# Patient Record
Sex: MALE | Race: WHITE | Employment: FULL TIME | URBAN - METROPOLITAN AREA
[De-identification: names, ages, dates, MRNs, and addresses within clinical notes are randomized per-mention and may not be internally consistent; named-entity substitution may affect disease eponyms.]

---

## 2018-10-31 ENCOUNTER — HOSPITAL ENCOUNTER (EMERGENCY)
Age: 23
Discharge: HOME | End: 2018-10-31
Payer: COMMERCIAL

## 2018-10-31 VITALS
TEMPERATURE: 98.4 F | DIASTOLIC BLOOD PRESSURE: 98 MMHG | HEART RATE: 100 BPM | WEIGHT: 272 LBS | RESPIRATION RATE: 18 BRPM | SYSTOLIC BLOOD PRESSURE: 121 MMHG | OXYGEN SATURATION: 99 % | BODY MASS INDEX: 37.94 KG/M2

## 2018-10-31 DIAGNOSIS — M25.512 ACUTE PAIN OF LEFT SHOULDER: Primary | ICD-10-CM

## 2018-10-31 DIAGNOSIS — M54.50 ACUTE BILATERAL LOW BACK PAIN WITHOUT SCIATICA: ICD-10-CM

## 2018-10-31 PROCEDURE — 96372 THER/PROPH/DIAG INJ SC/IM: CPT

## 2018-10-31 PROCEDURE — 6370000000 HC RX 637 (ALT 250 FOR IP): Performed by: PHYSICIAN ASSISTANT

## 2018-10-31 PROCEDURE — 6360000002 HC RX W HCPCS: Performed by: PHYSICIAN ASSISTANT

## 2018-10-31 PROCEDURE — 99282 EMERGENCY DEPT VISIT SF MDM: CPT

## 2018-10-31 RX ORDER — DIAZEPAM 5 MG/1
5 TABLET ORAL ONCE
Status: COMPLETED | OUTPATIENT
Start: 2018-10-31 | End: 2018-10-31

## 2018-10-31 RX ORDER — LIDOCAINE 4 G/G
1 PATCH TOPICAL ONCE
Status: DISCONTINUED | OUTPATIENT
Start: 2018-10-31 | End: 2018-11-01 | Stop reason: HOSPADM

## 2018-10-31 RX ORDER — DIAZEPAM 5 MG/1
5 TABLET ORAL EVERY 6 HOURS PRN
Qty: 10 TABLET | Refills: 0 | Status: SHIPPED | OUTPATIENT
Start: 2018-10-31 | End: 2018-11-07

## 2018-10-31 RX ORDER — KETOROLAC TROMETHAMINE 30 MG/ML
30 INJECTION, SOLUTION INTRAMUSCULAR; INTRAVENOUS ONCE
Status: COMPLETED | OUTPATIENT
Start: 2018-10-31 | End: 2018-10-31

## 2018-10-31 RX ORDER — NAPROXEN 500 MG/1
500 TABLET ORAL 2 TIMES DAILY PRN
Qty: 30 TABLET | Refills: 0 | Status: SHIPPED | OUTPATIENT
Start: 2018-10-31 | End: 2021-11-11

## 2018-10-31 RX ADMIN — DIAZEPAM 5 MG: 5 TABLET ORAL at 22:46

## 2018-10-31 RX ADMIN — KETOROLAC TROMETHAMINE 30 MG: 30 INJECTION, SOLUTION INTRAMUSCULAR; INTRAVENOUS at 22:46

## 2018-10-31 ASSESSMENT — PAIN SCALES - GENERAL
PAINLEVEL_OUTOF10: 8
PAINLEVEL_OUTOF10: 7

## 2018-10-31 ASSESSMENT — PAIN DESCRIPTION - LOCATION: LOCATION: BACK

## 2018-10-31 ASSESSMENT — PAIN DESCRIPTION - PAIN TYPE: TYPE: ACUTE PAIN

## 2018-11-01 NOTE — ED PROVIDER NOTES
MEDICAL DECISION MAKING                     Based on Pt's history (including stratification of the above red flags) & physical exam findings today, I do not see evidence of spinal cord compression/focal neuro deficits, cauda equina syndrome, epidural abscess, or osteomyelitis on exam today. I suspect patient may have started pain prior to work but work has likely exacerbated his symptoms causing worsening pain. Patient has not had any injury or trauma. No suspicion for acute causes patient's pain. Patient likely has muscular skeletal strain. Recommend rice therapy, anti-inflammatory, stretching. Patient also complained of some left shoulder pain although when I enter the room patient had his hand propped up behind his arm without any difficulty but demented exam acted as if this was painful to range of motion the shoulder. When not examining the shoulder he had full range of motion of the shoulder. Patient is neurologically intact, nontoxic appearing, tolerating by mouth intake. No numbness or tingling no difficulty ambulating. Suspect strain. Recommend rice therapy, and temperature is, stretching, back strengthening exercises for injury at his new job in the future. Patient comfortable with this workup and plan as his spouse at bedside. History and exam is consistent with musculoskeletal back pain - Symptomatic treatment provided today. I discussed stretching exercises and avoid vigorous activity today at bedside. I recommend supportive OTC back brace for the next several days. I recommend followup with primary care provider over the next several days for recheck. Patient agrees to return emergency department if symptoms worsen or any new symptoms develop - this was discussed in detail at beside with Pt. Vital signs and nursing notes reviewed during ED course. I have independently evaluated this patient .   Supervising MD present in the Emergency Department, available for consultation, throughout entirety of  patient care. Clinical  IMPRESSION    1. Acute pain of left shoulder    2. Acute bilateral low back pain without sciatica                Comment: Please note this report has been produced using speech recognition software and may contain errors related to that system including errors in grammar, punctuation, and spelling, as well as words and phrases that may be inappropriate. If there are any questions or concerns please feel free to contact the dictating provider for clarification.         Sadia Epperson PA-C  10/31/18 7125

## 2019-02-09 ENCOUNTER — HOSPITAL ENCOUNTER (EMERGENCY)
Age: 24
Discharge: HOME | End: 2019-02-09
Payer: COMMERCIAL

## 2019-02-09 VITALS
SYSTOLIC BLOOD PRESSURE: 120 MMHG | BODY MASS INDEX: 38.19 KG/M2 | RESPIRATION RATE: 16 BRPM | HEART RATE: 92 BPM | DIASTOLIC BLOOD PRESSURE: 76 MMHG | HEIGHT: 72 IN | WEIGHT: 282 LBS | TEMPERATURE: 98 F | OXYGEN SATURATION: 95 %

## 2019-02-09 DIAGNOSIS — R11.2 NON-INTRACTABLE VOMITING WITH NAUSEA, UNSPECIFIED VOMITING TYPE: Primary | ICD-10-CM

## 2019-02-09 LAB
ALBUMIN SERPL-MCNC: 4.2 GM/DL (ref 3.4–5)
ALP BLD-CCNC: 73 IU/L (ref 40–129)
ALT SERPL-CCNC: 50 U/L (ref 10–40)
ANION GAP SERPL CALCULATED.3IONS-SCNC: 16 MMOL/L (ref 4–16)
AST SERPL-CCNC: 33 IU/L (ref 15–37)
BASOPHILS ABSOLUTE: 0 K/CU MM
BASOPHILS RELATIVE PERCENT: 0.4 % (ref 0–1)
BILIRUB SERPL-MCNC: 0.4 MG/DL (ref 0–1)
BUN BLDV-MCNC: 13 MG/DL (ref 6–23)
CALCIUM SERPL-MCNC: 9.1 MG/DL (ref 8.3–10.6)
CHLORIDE BLD-SCNC: 103 MMOL/L (ref 99–110)
CO2: 23 MMOL/L (ref 21–32)
CREAT SERPL-MCNC: 0.9 MG/DL (ref 0.9–1.3)
DIFFERENTIAL TYPE: ABNORMAL
EOSINOPHILS ABSOLUTE: 0 K/CU MM
EOSINOPHILS RELATIVE PERCENT: 0.4 % (ref 0–3)
GFR AFRICAN AMERICAN: >60 ML/MIN/1.73M2
GFR NON-AFRICAN AMERICAN: >60 ML/MIN/1.73M2
GLUCOSE BLD-MCNC: 117 MG/DL (ref 70–99)
HCT VFR BLD CALC: 49 % (ref 42–52)
HEMOGLOBIN: 16.3 GM/DL (ref 13.5–18)
IMMATURE NEUTROPHIL %: 0.3 % (ref 0–0.43)
LIPASE: 20 IU/L (ref 13–60)
LYMPHOCYTES ABSOLUTE: 1.4 K/CU MM
LYMPHOCYTES RELATIVE PERCENT: 20.1 % (ref 24–44)
MCH RBC QN AUTO: 29.6 PG (ref 27–31)
MCHC RBC AUTO-ENTMCNC: 33.3 % (ref 32–36)
MCV RBC AUTO: 89.1 FL (ref 78–100)
MONOCYTES ABSOLUTE: 0.7 K/CU MM
MONOCYTES RELATIVE PERCENT: 9.4 % (ref 0–4)
NUCLEATED RBC %: 0 %
PDW BLD-RTO: 12.2 % (ref 11.7–14.9)
PLATELET # BLD: 304 K/CU MM (ref 140–440)
PMV BLD AUTO: 10.4 FL (ref 7.5–11.1)
POTASSIUM SERPL-SCNC: 3.6 MMOL/L (ref 3.5–5.1)
RBC # BLD: 5.5 M/CU MM (ref 4.6–6.2)
SEGMENTED NEUTROPHILS ABSOLUTE COUNT: 5 K/CU MM
SEGMENTED NEUTROPHILS RELATIVE PERCENT: 69.4 % (ref 36–66)
SODIUM BLD-SCNC: 142 MMOL/L (ref 135–145)
TOTAL IMMATURE NEUTOROPHIL: 0.02 K/CU MM
TOTAL NUCLEATED RBC: 0 K/CU MM
TOTAL PROTEIN: 7.5 GM/DL (ref 6.4–8.2)
WBC # BLD: 7.1 K/CU MM (ref 4–10.5)

## 2019-02-09 PROCEDURE — 93010 ELECTROCARDIOGRAM REPORT: CPT | Performed by: INTERNAL MEDICINE

## 2019-02-09 PROCEDURE — 85025 COMPLETE CBC W/AUTO DIFF WBC: CPT

## 2019-02-09 PROCEDURE — 80053 COMPREHEN METABOLIC PANEL: CPT

## 2019-02-09 PROCEDURE — 93005 ELECTROCARDIOGRAM TRACING: CPT | Performed by: PHYSICIAN ASSISTANT

## 2019-02-09 PROCEDURE — 83690 ASSAY OF LIPASE: CPT

## 2019-02-09 PROCEDURE — 6370000000 HC RX 637 (ALT 250 FOR IP): Performed by: EMERGENCY MEDICINE

## 2019-02-09 PROCEDURE — 99285 EMERGENCY DEPT VISIT HI MDM: CPT

## 2019-02-09 RX ORDER — ONDANSETRON 4 MG/1
4 TABLET, ORALLY DISINTEGRATING ORAL ONCE
Status: COMPLETED | OUTPATIENT
Start: 2019-02-09 | End: 2019-02-09

## 2019-02-09 RX ORDER — ONDANSETRON 4 MG/1
4 TABLET, FILM COATED ORAL EVERY 8 HOURS PRN
Qty: 10 TABLET | Refills: 0 | Status: SHIPPED | OUTPATIENT
Start: 2019-02-09 | End: 2021-11-11

## 2019-02-09 RX ADMIN — ONDANSETRON 4 MG: 4 TABLET, ORALLY DISINTEGRATING ORAL at 23:28

## 2019-02-12 LAB
EKG ATRIAL RATE: 87 BPM
EKG DIAGNOSIS: NORMAL
EKG P AXIS: 45 DEGREES
EKG P-R INTERVAL: 150 MS
EKG Q-T INTERVAL: 356 MS
EKG QRS DURATION: 90 MS
EKG QTC CALCULATION (BAZETT): 428 MS
EKG R AXIS: 18 DEGREES
EKG T AXIS: 17 DEGREES
EKG VENTRICULAR RATE: 87 BPM

## 2021-11-11 ENCOUNTER — HOSPITAL ENCOUNTER (INPATIENT)
Age: 26
LOS: 5 days | Discharge: HOME | DRG: 137 | End: 2021-11-16
Attending: EMERGENCY MEDICINE | Admitting: FAMILY MEDICINE
Payer: COMMERCIAL

## 2021-11-11 ENCOUNTER — APPOINTMENT (OUTPATIENT)
Dept: GENERAL RADIOLOGY | Age: 26
DRG: 137 | End: 2021-11-11
Payer: COMMERCIAL

## 2021-11-11 DIAGNOSIS — U07.1 COVID-19: Primary | ICD-10-CM

## 2021-11-11 DIAGNOSIS — J96.01 ACUTE RESPIRATORY FAILURE WITH HYPOXIA (HCC): ICD-10-CM

## 2021-11-11 PROBLEM — J12.82 PNEUMONIA DUE TO COVID-19 VIRUS: Status: ACTIVE | Noted: 2021-11-11

## 2021-11-11 LAB
ALBUMIN SERPL-MCNC: 3.7 GM/DL (ref 3.4–5)
ALP BLD-CCNC: 46 IU/L (ref 40–129)
ALT SERPL-CCNC: 69 U/L (ref 10–40)
ANION GAP SERPL CALCULATED.3IONS-SCNC: 14 MMOL/L (ref 4–16)
AST SERPL-CCNC: 87 IU/L (ref 15–37)
BASOPHILS ABSOLUTE: 0 K/CU MM
BASOPHILS RELATIVE PERCENT: 0.1 % (ref 0–1)
BILIRUB SERPL-MCNC: 0.6 MG/DL (ref 0–1)
BUN BLDV-MCNC: 12 MG/DL (ref 6–23)
CALCIUM SERPL-MCNC: 7.9 MG/DL (ref 8.3–10.6)
CHLORIDE BLD-SCNC: 96 MMOL/L (ref 99–110)
CO2: 24 MMOL/L (ref 21–32)
CREAT SERPL-MCNC: 0.9 MG/DL (ref 0.9–1.3)
D DIMER: 330 NG/ML(DDU)
DIFFERENTIAL TYPE: ABNORMAL
EOSINOPHILS ABSOLUTE: 0 K/CU MM
EOSINOPHILS RELATIVE PERCENT: 0 % (ref 0–3)
FERRITIN: 850 NG/ML (ref 30–400)
GFR AFRICAN AMERICAN: >60 ML/MIN/1.73M2
GFR NON-AFRICAN AMERICAN: >60 ML/MIN/1.73M2
GLUCOSE BLD-MCNC: 130 MG/DL (ref 70–99)
HCT VFR BLD CALC: 48.1 % (ref 42–52)
HEMOGLOBIN: 16.4 GM/DL (ref 13.5–18)
HIGH SENSITIVE C-REACTIVE PROTEIN: 154.6 MG/L
IMMATURE NEUTROPHIL %: 0.4 % (ref 0–0.43)
LACTATE DEHYDROGENASE: 595 IU/L (ref 120–246)
LYMPHOCYTES ABSOLUTE: 0.4 K/CU MM
LYMPHOCYTES RELATIVE PERCENT: 5.1 % (ref 24–44)
MCH RBC QN AUTO: 29.2 PG (ref 27–31)
MCHC RBC AUTO-ENTMCNC: 34.1 % (ref 32–36)
MCV RBC AUTO: 85.7 FL (ref 78–100)
MONOCYTES ABSOLUTE: 0.2 K/CU MM
MONOCYTES RELATIVE PERCENT: 2.3 % (ref 0–4)
NUCLEATED RBC %: 0 %
PDW BLD-RTO: 12.2 % (ref 11.7–14.9)
PLATELET # BLD: 222 K/CU MM (ref 140–440)
PMV BLD AUTO: 9.7 FL (ref 7.5–11.1)
POTASSIUM SERPL-SCNC: 3.6 MMOL/L (ref 3.5–5.1)
PROCALCITONIN: 0.15
RAPID INFLUENZA  B AGN: NEGATIVE
RAPID INFLUENZA A AGN: NEGATIVE
RBC # BLD: 5.61 M/CU MM (ref 4.6–6.2)
SARS-COV-2, NAAT: DETECTED
SEGMENTED NEUTROPHILS ABSOLUTE COUNT: 6.5 K/CU MM
SEGMENTED NEUTROPHILS RELATIVE PERCENT: 92.1 % (ref 36–66)
SODIUM BLD-SCNC: 134 MMOL/L (ref 135–145)
SOURCE: ABNORMAL
TOTAL IMMATURE NEUTOROPHIL: 0.03 K/CU MM
TOTAL NUCLEATED RBC: 0 K/CU MM
TOTAL PROTEIN: 6.8 GM/DL (ref 6.4–8.2)
WBC # BLD: 7.1 K/CU MM (ref 4–10.5)

## 2021-11-11 PROCEDURE — 85379 FIBRIN DEGRADATION QUANT: CPT

## 2021-11-11 PROCEDURE — 87635 SARS-COV-2 COVID-19 AMP PRB: CPT

## 2021-11-11 PROCEDURE — 96374 THER/PROPH/DIAG INJ IV PUSH: CPT

## 2021-11-11 PROCEDURE — 86141 C-REACTIVE PROTEIN HS: CPT

## 2021-11-11 PROCEDURE — 2580000003 HC RX 258: Performed by: NURSE PRACTITIONER

## 2021-11-11 PROCEDURE — 82728 ASSAY OF FERRITIN: CPT

## 2021-11-11 PROCEDURE — 6370000000 HC RX 637 (ALT 250 FOR IP): Performed by: NURSE PRACTITIONER

## 2021-11-11 PROCEDURE — 85025 COMPLETE CBC W/AUTO DIFF WBC: CPT

## 2021-11-11 PROCEDURE — 2060000000 HC ICU INTERMEDIATE R&B

## 2021-11-11 PROCEDURE — 2500000003 HC RX 250 WO HCPCS: Performed by: NURSE PRACTITIONER

## 2021-11-11 PROCEDURE — 6360000002 HC RX W HCPCS: Performed by: NURSE PRACTITIONER

## 2021-11-11 PROCEDURE — 84145 PROCALCITONIN (PCT): CPT

## 2021-11-11 PROCEDURE — XW0DXM6 INTRODUCTION OF BARICITINIB INTO MOUTH AND PHARYNX, EXTERNAL APPROACH, NEW TECHNOLOGY GROUP 6: ICD-10-PCS | Performed by: INTERNAL MEDICINE

## 2021-11-11 PROCEDURE — 94640 AIRWAY INHALATION TREATMENT: CPT

## 2021-11-11 PROCEDURE — 94761 N-INVAS EAR/PLS OXIMETRY MLT: CPT

## 2021-11-11 PROCEDURE — 2700000000 HC OXYGEN THERAPY PER DAY

## 2021-11-11 PROCEDURE — 6360000002 HC RX W HCPCS: Performed by: EMERGENCY MEDICINE

## 2021-11-11 PROCEDURE — 80053 COMPREHEN METABOLIC PANEL: CPT

## 2021-11-11 PROCEDURE — 99284 EMERGENCY DEPT VISIT MOD MDM: CPT

## 2021-11-11 PROCEDURE — 87324 CLOSTRIDIUM AG IA: CPT

## 2021-11-11 PROCEDURE — XW033E5 INTRODUCTION OF REMDESIVIR ANTI-INFECTIVE INTO PERIPHERAL VEIN, PERCUTANEOUS APPROACH, NEW TECHNOLOGY GROUP 5: ICD-10-PCS | Performed by: INTERNAL MEDICINE

## 2021-11-11 PROCEDURE — 87449 NOS EACH ORGANISM AG IA: CPT

## 2021-11-11 PROCEDURE — 71045 X-RAY EXAM CHEST 1 VIEW: CPT

## 2021-11-11 PROCEDURE — 83615 LACTATE (LD) (LDH) ENZYME: CPT

## 2021-11-11 PROCEDURE — 87804 INFLUENZA ASSAY W/OPTIC: CPT

## 2021-11-11 PROCEDURE — 6370000000 HC RX 637 (ALT 250 FOR IP): Performed by: FAMILY MEDICINE

## 2021-11-11 RX ORDER — DEXAMETHASONE SODIUM PHOSPHATE 10 MG/ML
10 INJECTION, SOLUTION INTRAMUSCULAR; INTRAVENOUS ONCE
Status: COMPLETED | OUTPATIENT
Start: 2021-11-11 | End: 2021-11-11

## 2021-11-11 RX ORDER — SODIUM CHLORIDE 0.9 % (FLUSH) 0.9 %
5-40 SYRINGE (ML) INJECTION PRN
Status: DISCONTINUED | OUTPATIENT
Start: 2021-11-11 | End: 2021-11-16 | Stop reason: HOSPADM

## 2021-11-11 RX ORDER — ASCORBIC ACID 500 MG
500 TABLET ORAL DAILY
Status: DISCONTINUED | OUTPATIENT
Start: 2021-11-11 | End: 2021-11-16 | Stop reason: HOSPADM

## 2021-11-11 RX ORDER — SODIUM CHLORIDE 0.9 % (FLUSH) 0.9 %
5-40 SYRINGE (ML) INJECTION EVERY 12 HOURS SCHEDULED
Status: DISCONTINUED | OUTPATIENT
Start: 2021-11-11 | End: 2021-11-16 | Stop reason: HOSPADM

## 2021-11-11 RX ORDER — DEXAMETHASONE SODIUM PHOSPHATE 4 MG/ML
6 INJECTION, SOLUTION INTRA-ARTICULAR; INTRALESIONAL; INTRAMUSCULAR; INTRAVENOUS; SOFT TISSUE EVERY 24 HOURS
Status: DISCONTINUED | OUTPATIENT
Start: 2021-11-12 | End: 2021-11-16 | Stop reason: HOSPADM

## 2021-11-11 RX ORDER — ALBUTEROL SULFATE 90 UG/1
2 AEROSOL, METERED RESPIRATORY (INHALATION) EVERY 4 HOURS
Status: DISCONTINUED | OUTPATIENT
Start: 2021-11-11 | End: 2021-11-15

## 2021-11-11 RX ORDER — FAMOTIDINE 20 MG/1
20 TABLET, FILM COATED ORAL 2 TIMES DAILY
Status: DISCONTINUED | OUTPATIENT
Start: 2021-11-11 | End: 2021-11-16 | Stop reason: HOSPADM

## 2021-11-11 RX ORDER — ZINC SULFATE 50(220)MG
50 CAPSULE ORAL DAILY
Status: DISCONTINUED | OUTPATIENT
Start: 2021-11-11 | End: 2021-11-16 | Stop reason: HOSPADM

## 2021-11-11 RX ORDER — 0.9 % SODIUM CHLORIDE 0.9 %
30 INTRAVENOUS SOLUTION INTRAVENOUS PRN
Status: DISCONTINUED | OUTPATIENT
Start: 2021-11-11 | End: 2021-11-16 | Stop reason: HOSPADM

## 2021-11-11 RX ORDER — ALBUTEROL SULFATE 90 UG/1
2 AEROSOL, METERED RESPIRATORY (INHALATION)
Status: DISCONTINUED | OUTPATIENT
Start: 2021-11-11 | End: 2021-11-16 | Stop reason: HOSPADM

## 2021-11-11 RX ORDER — BENZONATATE 100 MG/1
100 CAPSULE ORAL 3 TIMES DAILY PRN
Status: DISCONTINUED | OUTPATIENT
Start: 2021-11-11 | End: 2021-11-16 | Stop reason: HOSPADM

## 2021-11-11 RX ORDER — POLYETHYLENE GLYCOL 3350 17 G/17G
17 POWDER, FOR SOLUTION ORAL DAILY PRN
Status: DISCONTINUED | OUTPATIENT
Start: 2021-11-11 | End: 2021-11-16 | Stop reason: HOSPADM

## 2021-11-11 RX ORDER — ONDANSETRON 2 MG/ML
4 INJECTION INTRAMUSCULAR; INTRAVENOUS EVERY 6 HOURS PRN
Status: DISCONTINUED | OUTPATIENT
Start: 2021-11-11 | End: 2021-11-16 | Stop reason: HOSPADM

## 2021-11-11 RX ORDER — ACETAMINOPHEN 325 MG/1
650 TABLET ORAL EVERY 6 HOURS PRN
Status: DISCONTINUED | OUTPATIENT
Start: 2021-11-11 | End: 2021-11-16 | Stop reason: HOSPADM

## 2021-11-11 RX ORDER — ONDANSETRON 4 MG/1
4 TABLET, ORALLY DISINTEGRATING ORAL EVERY 8 HOURS PRN
Status: DISCONTINUED | OUTPATIENT
Start: 2021-11-11 | End: 2021-11-16 | Stop reason: HOSPADM

## 2021-11-11 RX ORDER — GUAIFENESIN/DEXTROMETHORPHAN 100-10MG/5
5 SYRUP ORAL EVERY 4 HOURS PRN
Status: DISCONTINUED | OUTPATIENT
Start: 2021-11-11 | End: 2021-11-16 | Stop reason: HOSPADM

## 2021-11-11 RX ORDER — ACETAMINOPHEN 650 MG/1
650 SUPPOSITORY RECTAL EVERY 6 HOURS PRN
Status: DISCONTINUED | OUTPATIENT
Start: 2021-11-11 | End: 2021-11-16 | Stop reason: HOSPADM

## 2021-11-11 RX ORDER — SODIUM CHLORIDE 9 MG/ML
25 INJECTION, SOLUTION INTRAVENOUS PRN
Status: DISCONTINUED | OUTPATIENT
Start: 2021-11-11 | End: 2021-11-16 | Stop reason: HOSPADM

## 2021-11-11 RX ADMIN — IPRATROPIUM BROMIDE 2 PUFF: 17 AEROSOL, METERED RESPIRATORY (INHALATION) at 13:35

## 2021-11-11 RX ADMIN — ENOXAPARIN SODIUM 30 MG: 100 INJECTION SUBCUTANEOUS at 20:19

## 2021-11-11 RX ADMIN — IPRATROPIUM BROMIDE 2 PUFF: 17 AEROSOL, METERED RESPIRATORY (INHALATION) at 07:45

## 2021-11-11 RX ADMIN — ENOXAPARIN SODIUM 30 MG: 100 INJECTION SUBCUTANEOUS at 09:48

## 2021-11-11 RX ADMIN — ALBUTEROL SULFATE 2 PUFF: 90 AEROSOL, METERED RESPIRATORY (INHALATION) at 07:45

## 2021-11-11 RX ADMIN — BARICITINIB 4 MG: 2 TABLET, FILM COATED ORAL at 13:34

## 2021-11-11 RX ADMIN — SODIUM CHLORIDE, PRESERVATIVE FREE 10 ML: 5 INJECTION INTRAVENOUS at 20:19

## 2021-11-11 RX ADMIN — Medication 50 MG: at 09:47

## 2021-11-11 RX ADMIN — SODIUM CHLORIDE, PRESERVATIVE FREE 10 ML: 5 INJECTION INTRAVENOUS at 13:35

## 2021-11-11 RX ADMIN — OXYCODONE HYDROCHLORIDE AND ACETAMINOPHEN 500 MG: 500 TABLET ORAL at 09:49

## 2021-11-11 RX ADMIN — FAMOTIDINE 20 MG: 20 TABLET, FILM COATED ORAL at 20:19

## 2021-11-11 RX ADMIN — Medication 2000 UNITS: at 09:47

## 2021-11-11 RX ADMIN — REMDESIVIR 200 MG: 100 INJECTION, POWDER, LYOPHILIZED, FOR SOLUTION INTRAVENOUS at 15:45

## 2021-11-11 RX ADMIN — DEXAMETHASONE SODIUM PHOSPHATE 10 MG: 10 INJECTION, SOLUTION INTRAMUSCULAR; INTRAVENOUS at 05:34

## 2021-11-11 RX ADMIN — FAMOTIDINE 20 MG: 20 TABLET, FILM COATED ORAL at 09:48

## 2021-11-11 RX ADMIN — ALBUTEROL SULFATE 2 PUFF: 90 AEROSOL, METERED RESPIRATORY (INHALATION) at 13:08

## 2021-11-11 NOTE — CONSULTS
Pharmacy Consult for Remdesivir Initiation per Josh Grissom, APRN-CNP    Criteria per [address] [employer]  **All criteria need to be met to receive   Remdesivir for COVID-19 patients**   Laboratory Results    Confirmed positive COVID-19   Yes   Clinical Status     Within 7 days of symptom onset (< 7 days)  Receiving corticosteroid therapy      Yes   Oxygen Status     Requiring supplemental oxygen (<10L)   Yes   Special Considerations    eGFR < 30mL/min: use with caution due to risk of cyclodextrin toxicity      None     Contraindications    1. Invasive or non-invasive mechanical ventilation (>10L NC, HFNC, heated vapotherm, biPAP, or mechanical ventilation)    2. Use of more than 1 vasopressor agent    3. Improving on current treatment/supportive regimen     4. Hepatotoxicity - ALT >10x ULN    5. Elevated inflammatory biomarkers? (Any elevation in CRP, D-dimer, ferritin, or LDH)   ? None       Based on the above criteria, the patient is a candidate for remdesivir therapy. Remdesivir 200 mg on day 1, followed by 100 mg daily on days 2-5. The following test will also be ordered:  CMP x5 days  CBC x5 days    Thank you for the consult,  Morteza Mccormick.  Kirill Slater, [address]  11/11/2021 @ 7:38 AM

## 2021-11-11 NOTE — ED NOTES
Bed: ED-18  Expected date:   Expected time:   Means of arrival:   Comments:  [address], RN  11/11/21 7613

## 2021-11-11 NOTE — CONSULTS
Pharmacy Consult for Baricitinib Initiation per Dr. Kamryn Monk per [facility] P&T Committee  **All criteria need to be met to receive   Baricitinib for COVID-19 patients**   Age    >5years old     Yes   Laboratory Results    Confirmed positive COVID-19     PLUS    ANY elevation in biomarkers   (CRP, D-dimer, LDH, ferritin)       Yes     Concomitant Therapy    Receiving systemic steroids for treatment of COVID 19     Yes   Oxygen Status        Requiring supplemental oxygen   (>1 L NC, HFNC, Heated Vapotherm, biPAP, mechanical ventilation)        Yes   Special Considerations    Pregnancy  Severe Hepatic Impairment  Chronic/Recurrent Infections   Hemoglobin <8         Clarify risk vs. benefit with provider if criteria met     Contraindications    1. Invasive active mycobacterial or fungal infection  2. Lymphocyte count <200  3. Neutrophil count, ANC <500   4. Significant immunosuppression    ? None     Dosing Recommendations:    Baricitinib 4 mg PO daily x 14 days (or until hospital discharge)    Renal dose adjustment:  -eGFR > 60: no adjustment necessary       Thank you for the consult,  Joselin Carlos  11/11/2021 @ 10:14 AM

## 2021-11-11 NOTE — H&P
History and Physical      Name:  Ismael Grande /Age/Sex: 1995  (27 y.o. male)   MRN & CSN:  [id number] & [id number] Admission Date/Time: 2021  5:09 AM   Location:  ED18/ED-18 PCP: Misael Blue MD       Hospital Day: 1           Assessment and Plan:   # Covid -19 pneumonia with hypoxia - CXR noted for patchy bilateral infiltrates. Had outpt +Covid testing. Covid PCR pending. Will check influenza A/B and pro-Yousif.  Pharmacy consultation for initiation Remdesivir-monitor LFT's, IV Decadron, Scheduled MDI's, IV antibiotic pending pro-Yousif, Supplemental O2, continuous pulse ox montioring, adjunctive medications, anti-tussive/expectorant medications. COVID labs, Respiratory/contact isolation. Monitor closely   # Acute hypoxic respiratory failure due to COVID 19 - Pt not on home O2. Currently requiring 2l per nc. POC as noted above, monitor blood gases and respiratory status, monitor need for Vapotherm/BIPAP/intubation. # Diarrhea in setting of Covid -19 - Can check for Cdiff if diarrhea continues  # Transaminitis - Normal bili. Denies abd pain, check acute hep panel  # Hyperglycemia - suspect stress induced, will check A1C in am, monitor  # Mild hyponatremia - Encourage oral hydration for now, caution with IV hydration in COVID -19 patients    Present on Admission:   Pneumonia due to COVID-19 virus             Diet ADULT DIET; Regular   DVT Prophylaxis [x] Lovenox, []  Heparin, [] SCDs, [] Ambulation  [] Long term AC   GI Prophylaxis [] PPI,  [x] H2 Blocker,  [] Carafate,  [] Diet/Tube Feeds   Code Status Full Code   Disposition Admit to med/surg in pt. Patient plans to return home upon discharge         Chief Complaint: Shortness of Breath (Tested + for covid 21 )      History obtained from patient  History of Present Illness:   Ismael Grande is a 27 y.o. male  with no significant medical history who presents with shortness of breath. The patient reports had stuffy nose last .  His symptoms later progressed to fever, cough, shortness of breath and diarrhea. He reports temp max of 102.1F. He denies chest pain. He denies n/v, bloody or black stools. He had outside positive COVID -19 testing done Thursday. He denies recent sick contacts, although he reports close family members tested positive last week. He has not received his COVID-19 vaccination. He had worsening shortness of breath today therefore sought medical attention. He denies any other acute issues. He presented to ED with temp 99.7F, pulse 100, respirations 22, /83,  O2 sat 82% on RA was reported and the patient was placed on O2 @ 2l per nc with sats  92%. Chest X-ray showed patchy bilateral infiltrates. He had normal wbc, Hgb/HCT. Chemistry panel significant for sodium 134, glucose 130, calcium 7.9, AST 87, ALT 69 otherwise unremarkable. The patient has been admitted for further management. 14 organ systems : reviewed and are negative, unless as noted in above HPI. Objective:   No intake or output data in the 24 hours ending 11/11/21 0732     Vitals:   Vitals:    11/11/21 0515 11/11/21 0516   BP:  127/83   Pulse: 100    Resp: 22    Temp:  99.7 °F (37.6 °C)   SpO2: 92%    Weight: 290 lb (131.5 kg)    Height: 6' (1.829 m)        Physical Exam: 11/11/21     GEN -Awake, alert appearing male, in NAD. Appears given age. EYES -anicteric, conjunctiva pink. HENT -Head is normocephalic, atraumatic. MM are moist. Oral pharynx without exudates, no evidence of thrush. NECK -Supple, no apparent thyromegaly or masses. RESP -Course breath sounds, no wheezing. Resp unlabored, tachypneic  Symmetric chest movement   C/V -S1/S2 auscultated. RRR without appreciable M/R/G. No JVD. Cap refill <3 sec. No peripheral edema. GI -Abdomen is soft non distended, non tender to palpation. + BS. No masses or guarding.  -No CVA/ flank tenderness. LYMPH- No petechiae or ecchymoses. MS -No gross joint deformities.   SKIN -Normal coloration, warm, moist.  NEURO-Cranial nerves appear grossly intact, normal speech, no lateralizing weakness. PSYC-Awake, alert, oriented x 3. Appropriate affect. Past Medical History:    No past medical history on file. Denies PMH    Past Surgical  History:    has a past surgical history that includes External ear surgery; Tympanostomy tube placement; and Inner ear surgery. Family  History:   family history is not on file. Reviewed with patient  Mother - No medical history  Father - No medical history      Social History:     Social History     Socioeconomic History    Marital status: Single     Spouse name: Not on file    Number of children: Not on file    Years of education: Not on file    Highest education level: Not on file   Occupational History    Not on file   Tobacco Use    Smoking status: Never Smoker    Smokeless tobacco: Never Used   Substance and Sexual Activity    Alcohol use: Yes    Drug use: No    Sexual activity: Not on file   Other Topics Concern    Not on file   Social History Narrative    Not on file     Social Determinants of Health     Financial Resource Strain:     Difficulty of Paying Living Expenses: Not on file   Food Insecurity:     Worried About Running Out of Food in the Last Year: Not on file    Mercedes of Food in the Last Year: Not on file   Transportation Needs:     Lack of Transportation (Medical): Not on file    Lack of Transportation (Non-Medical):  Not on file   Physical Activity:     Days of Exercise per Week: Not on file    Minutes of Exercise per Session: Not on file   Stress:     Feeling of Stress : Not on file   Social Connections:     Frequency of Communication with Friends and Family: Not on file    Frequency of Social Gatherings with Friends and Family: Not on file    Attends Mormonism Services: Not on file    Active Member of Clubs or Organizations: Not on file    Attends Club or Organization Meetings: Not on file    Marital Status: Not on file   Intimate Partner Violence:     Fear of Current or Ex-Partner: Not on file    Emotionally Abused: Not on file    Physically Abused: Not on file    Sexually Abused: Not on file   Housing Stability:     Unable to Pay for Housing in the Last Year: Not on file    Number of [place] in the Last Year: Not on file    Unstable Housing in the Last Year: Not on file      reports that he has never smoked. He has never used smokeless tobacco.   reports current alcohol use. reports no history of drug use. Allergies:   No Known Allergies    Home Medications:     Prior to Admission medications    Not on File         Medications:   Medications:    dexamethasone  6 mg IntraVENous Q24H    ascorbic acid  500 mg Oral Daily    zinc sulfate  50 mg Oral Daily    ipratropium  2 puff Inhalation Q4H    albuterol sulfate HFA  2 puff Inhalation Q4H    sodium chloride flush  5-40 mL IntraVENous 2 times per day    enoxaparin  30 mg SubCUTAneous BID    famotidine  20 mg Oral BID    Vitamin D  2,000 Units Oral Daily      Infusions:    sodium chloride       PRN Meds: sodium chloride flush, 5-40 mL, PRN  sodium chloride, 25 mL, PRN  ondansetron, 4 mg, Q8H PRN   Or  ondansetron, 4 mg, Q6H PRN  polyethylene glycol, 17 g, Daily PRN  acetaminophen, 650 mg, Q6H PRN   Or  acetaminophen, 650 mg, Q6H PRN  guaiFENesin-dextromethorphan, 5 mL, Q4H PRN        Data:     Laboratory this visit:  Reviewed  Recent Labs     11/11/21  0540   WBC 7.1   HGB 16.4   HCT 48.1         Recent Labs     11/11/21  0540   *   K 3.6   CL 96*   CO2 24   BUN 12   CREATININE 0.9     Recent Labs     11/11/21  0540   AST 87*   ALT 69*   BILITOT 0.6   ALKPHOS 46     No results for input(s): INR in the last 72 hours. Radiology this visit:  Reviewed. XR CHEST PORTABLE    Result Date: 11/11/2021  1. Patchy bilateral lung infiltrates, compatible with COVID pneumonia.            EKG this visit:  Pending review        Electronically signed by Bam Mitchell, JAN - CNP on 11/11/2021 at 7:32 AM

## 2021-11-11 NOTE — ED PROVIDER NOTES
Emergency Department Encounter  Location: [address] [place] [address] EMERGENCY DEPARTMENT    Patient: Marylene Sickles  MRN: [id number]  : 1995  Date of evaluation: 2021  ED Provider: Buddy Andersen MD    6AM  Marylene Sickles was checked out to me by Dr. Estrellita Perez. Please see his/her initial documentation for details of the patient's initial ED presentation, physical exam and completed studies. In brief, Marylene Sickles is a 27 y.o. male that presented to the emergency department with shortness of breath. Recently diagnosed with Covid. Oxygen saturation decreased to 82% on ambulation. Patient requiring supplemental oxygen. Patient received a dose of Decadron in the emergency room. CBC unremarkable, awaiting CMP and plan for admission. I have reviewed and interpreted all of the currently available lab results and diagnostics from this visit:  Results for orders placed or performed during the hospital encounter of 21   CBC Auto Differential   Result Value Ref Range    WBC 7.1 4.0 - 10.5 K/CU MM    RBC 5.61 4.6 - 6.2 M/CU MM    Hemoglobin 16.4 13.5 - 18.0 GM/DL    Hematocrit 48.1 42 - 52 %    MCV 85.7 78 - 100 FL    MCH 29.2 27 - 31 PG    MCHC 34.1 32.0 - 36.0 %    RDW 12.2 11.7 - 14.9 %    Platelets 474 751 - 362 K/CU MM    MPV 9.7 7.5 - 11.1 FL    Differential Type AUTOMATED DIFFERENTIAL     Segs Relative 92.1 (H) 36 - 66 %    Lymphocytes % 5.1 (L) 24 - 44 %    Monocytes % 2.3 0 - 4 %    Eosinophils % 0.0 0 - 3 %    Basophils % 0.1 0 - 1 %    Segs Absolute 6.5 K/CU MM    Lymphocytes Absolute 0.4 K/CU MM    Monocytes Absolute 0.2 K/CU MM    Eosinophils Absolute 0.0 K/CU MM    Basophils Absolute 0.0 K/CU MM    Nucleated RBC % 0.0 %    Total Nucleated RBC 0.0 K/CU MM    Total Immature Neutrophil 0.03 K/CU MM    Immature Neutrophil % 0.4 0 - 0.43 %     XR CHEST PORTABLE    Result Date: 2021  1. Patchy bilateral lung infiltrates, compatible with COVID pneumonia.        Final ED Course and MDM: In brief, Keren Elder is a 27 y.o. male whose care was signed out to me by the outgoing provider. CMP shows minimal hyponatremia, and a mild transaminitis. Will admit to the hospital for further evaluation and treatment. I spoke with Dr. Mendel Gola, who graciously agreed to admit the patient. I did don appropriate PPE (including N95 face mask, protective eye ware/safety glasses, gloves, hair covering, and no isolation gown), as recommended by the health facility/national standard best practice, during my bedside interactions with the patient. ED Medication Orders (From admission, onward)    Start Ordered     Status Ordering Provider    11/11/21 0515 11/11/21 0511  dexamethasone (PF) (DECADRON) injection 10 mg  ONCE         Last MAR action: Given - by Jacky Rodrigues on 11/11/21 at 0534 Diane Goodell          Final Impression      1. COVID-19    2.  Acute respiratory failure with hypoxia (HCC)        DISPOSITION       (Please note that portions of this note may have been completed with a voice recognition program. Efforts were made to edit the dictations but occasionally words are mis-transcribed.)    Cralee Varela MD  [id number] [address], [place]  11/11/21 6978

## 2021-11-11 NOTE — ED PROVIDER NOTES
Triage Chief Complaint:   Shortness of Breath (Tested + for covid 11/4/21 )    [place]:  Breonna Mcgregor is a 27 y.o. male that presents EMS for shortness of breath. Patient states that he was recently diagnosed with Covid. He has had symptoms for about 5 days with cough, difficulty breathing, fevers, diarrhea. Denies any headaches, body aches, nausea or vomiting. States that over the last day or 2 he has had gradual worsening shortness of breath especially with exertion and was the reason for calling EMS this morning. EMS reports his oxygen saturation was at 94% on room air but with ambulation dropped down to 82%. He was placed on 2 L nasal cannula. Patient denies any other acute complaints at this time. ROS:  At least 10 systems reviewed and otherwise acutely negative except as in the [address] No past medical history on file. Past Surgical History:   Procedure Laterality Date    EXTERNAL EAR SURGERY      INNER EAR SURGERY      TYMPANOSTOMY TUBE PLACEMENT       No family history on file. Social History     Socioeconomic History    Marital status: Single     Spouse name: Not on file    Number of children: Not on file    Years of education: Not on file    Highest education level: Not on file   Occupational History    Not on file   Tobacco Use    Smoking status: Never Smoker    Smokeless tobacco: Never Used   Substance and Sexual Activity    Alcohol use: Yes    Drug use: No    Sexual activity: Not on file   Other Topics Concern    Not on file   Social History Narrative    Not on file     Social Determinants of Health     Financial Resource Strain:     Difficulty of Paying Living Expenses: Not on file   Food Insecurity:     Worried About Running Out of Food in the Last Year: Not on file    Mercedes of Food in the Last Year: Not on file   Transportation Needs:     Lack of Transportation (Medical): Not on file    Lack of Transportation (Non-Medical):  Not on file   Physical Activity:     Days of Exercise per Week: Not on file    Minutes of Exercise per Session: Not on file   Stress:     Feeling of Stress : Not on file   Social Connections:     Frequency of Communication with Friends and Family: Not on file    Frequency of Social Gatherings with Friends and Family: Not on file    Attends Amish Services: Not on file    Active Member of [address] [place] [address] [employer] or Organizations: Not on file    Attends Club or Organization Meetings: Not on file    Marital Status: Not on file   Intimate Partner Violence:     Fear of Current or Ex-Partner: Not on file    Emotionally Abused: Not on file    Physically Abused: Not on file    Sexually Abused: Not on file   Housing Stability:     Unable to Pay for Housing in the Last Year: Not on file    Number of [place] in the Last Year: Not on file    Unstable Housing in the Last Year: Not on file     Current Facility-Administered Medications   Medication Dose Route Frequency Provider Last Rate Last Admin    dexamethasone (PF) (DECADRON) injection 10 mg  10 mg IntraVENous Once Tracee Hahn MD         No current outpatient medications on file. No Known Allergies    Nursing Notes Reviewed    Physical Exam:  ED Triage Vitals   Enc Vitals Group      BP       Pulse       Resp       Temp       Temp src       SpO2       Weight       Height       Head Circumference       Peak Flow       Pain Score       Pain Loc       Pain Edu? Excl. in [address]? GENERAL APPEARANCE: Awake and alert. Cooperative. No acute distress. HEAD: Normocephalic. Atraumatic. EYES: EOM's grossly intact. Sclera anicteric. ENT: Mucous membranes are moist. Tolerates saliva. No trismus. NECK: Supple. No meningismus. Trachea midline. HEART: RRR. Radial pulses 2+. LUNGS: Respirations mildly tachypneic. CTAB  ABDOMEN: Soft. Non-tender. No guarding or rebound. EXTREMITIES: No acute deformities. No edema  SKIN: Warm and dry. NEUROLOGICAL: No gross facial drooping.  Moves all 4 extremities spontaneously. PSYCHIATRIC: Normal mood. I have reviewed and interpreted all of the currently available lab results from this visit (if applicable):  No results found for this visit on 11/11/21. Radiographs (if obtained):  [] The following radiograph was interpreted by myself in the absence of a radiologist:  [x] Radiologist's Report Reviewed:    EKG (if obtained): (All EKG's are interpreted by myself in the absence of a cardiologist)    MDM:  Plan of care is discussed thoroughly with the patient and family if present. If performed, all imaging and lab work also discussed with patient. All relevant prior results and chart reviewed if available. Presents as above. He is not in acute distress but presents with exertional dyspnea and hypoxia secondary to recent Covid diagnosis. Patient is hemodynamically stable. Patient given dose of Decadron here. Chest x-ray, lab work is ordered. He will likely require admission for further management and observation. 0600:a.m.  I have signed out [facility] Emergency Department care to Dr. Sushma Barber. We discussed the pertinent history, physical exam, completed/pending test results (if applicable) and current treatment plan. Please refer to his/her chart for the patients remaining Emergency Department course and final disposition. Clinical Impression:  1. COVID-19    2.  Acute respiratory failure with hypoxia ([address]      (Please note that portions of this note may have been completed with a voice recognition program. Efforts were made to edit the dictations but occasionally words are mis-transcribed.)    Vinetta Lefort, MD Myrl Silvers, MD  11/11/21 8101

## 2021-11-12 LAB
ALBUMIN SERPL-MCNC: 3.4 GM/DL (ref 3.4–5)
ALP BLD-CCNC: 41 IU/L (ref 40–128)
ALT SERPL-CCNC: 64 U/L (ref 10–40)
ANION GAP SERPL CALCULATED.3IONS-SCNC: 16 MMOL/L (ref 4–16)
AST SERPL-CCNC: 65 IU/L (ref 15–37)
BASOPHILS ABSOLUTE: 0 K/CU MM
BASOPHILS RELATIVE PERCENT: 0.1 % (ref 0–1)
BILIRUB SERPL-MCNC: 0.4 MG/DL (ref 0–1)
BUN BLDV-MCNC: 16 MG/DL (ref 6–23)
CALCIUM SERPL-MCNC: 8.4 MG/DL (ref 8.3–10.6)
CHLORIDE BLD-SCNC: 97 MMOL/L (ref 99–110)
CO2: 24 MMOL/L (ref 21–32)
CREAT SERPL-MCNC: 0.9 MG/DL (ref 0.9–1.3)
D DIMER: 315 NG/ML(DDU)
DIFFERENTIAL TYPE: ABNORMAL
EOSINOPHILS ABSOLUTE: 0 K/CU MM
EOSINOPHILS RELATIVE PERCENT: 0 % (ref 0–3)
ESTIMATED AVERAGE GLUCOSE: 126 MG/DL
GFR AFRICAN AMERICAN: >60 ML/MIN/1.73M2
GFR NON-AFRICAN AMERICAN: >60 ML/MIN/1.73M2
GLUCOSE BLD-MCNC: 111 MG/DL (ref 70–99)
HAV IGM SER IA-ACNC: NON REACTIVE
HBA1C MFR BLD: 6 % (ref 4.2–6.3)
HCT VFR BLD CALC: 45.7 % (ref 42–52)
HEMOGLOBIN: 15.9 GM/DL (ref 13.5–18)
HEPATITIS B CORE IGM ANTIBODY: NON REACTIVE
HEPATITIS B SURFACE ANTIGEN: NON REACTIVE
HEPATITIS C ANTIBODY: NON REACTIVE
HIGH SENSITIVE C-REACTIVE PROTEIN: 140.2 MG/L
IMMATURE NEUTROPHIL %: 0.9 % (ref 0–0.43)
LYMPHOCYTES ABSOLUTE: 0.8 K/CU MM
LYMPHOCYTES RELATIVE PERCENT: 8.8 % (ref 24–44)
MCH RBC QN AUTO: 29.1 PG (ref 27–31)
MCHC RBC AUTO-ENTMCNC: 34.8 % (ref 32–36)
MCV RBC AUTO: 83.7 FL (ref 78–100)
MONOCYTES ABSOLUTE: 0.5 K/CU MM
MONOCYTES RELATIVE PERCENT: 5.4 % (ref 0–4)
NUCLEATED RBC %: 0 %
PDW BLD-RTO: 12.2 % (ref 11.7–14.9)
PLATELET # BLD: 265 K/CU MM (ref 140–440)
PMV BLD AUTO: 9.9 FL (ref 7.5–11.1)
POTASSIUM SERPL-SCNC: 3.8 MMOL/L (ref 3.5–5.1)
RBC # BLD: 5.46 M/CU MM (ref 4.6–6.2)
SEGMENTED NEUTROPHILS ABSOLUTE COUNT: 7.7 K/CU MM
SEGMENTED NEUTROPHILS RELATIVE PERCENT: 84.8 % (ref 36–66)
SODIUM BLD-SCNC: 137 MMOL/L (ref 135–145)
TOTAL IMMATURE NEUTOROPHIL: 0.08 K/CU MM
TOTAL NUCLEATED RBC: 0 K/CU MM
TOTAL PROTEIN: 6.4 GM/DL (ref 6.4–8.2)
WBC # BLD: 9.1 K/CU MM (ref 4–10.5)

## 2021-11-12 PROCEDURE — 2700000000 HC OXYGEN THERAPY PER DAY

## 2021-11-12 PROCEDURE — 2580000003 HC RX 258: Performed by: NURSE PRACTITIONER

## 2021-11-12 PROCEDURE — 85379 FIBRIN DEGRADATION QUANT: CPT

## 2021-11-12 PROCEDURE — 6360000002 HC RX W HCPCS: Performed by: NURSE PRACTITIONER

## 2021-11-12 PROCEDURE — 6360000002 HC RX W HCPCS: Performed by: FAMILY MEDICINE

## 2021-11-12 PROCEDURE — 86141 C-REACTIVE PROTEIN HS: CPT

## 2021-11-12 PROCEDURE — 85025 COMPLETE CBC W/AUTO DIFF WBC: CPT

## 2021-11-12 PROCEDURE — 94640 AIRWAY INHALATION TREATMENT: CPT

## 2021-11-12 PROCEDURE — 1200000000 HC SEMI PRIVATE

## 2021-11-12 PROCEDURE — 6370000000 HC RX 637 (ALT 250 FOR IP): Performed by: NURSE PRACTITIONER

## 2021-11-12 PROCEDURE — 83036 HEMOGLOBIN GLYCOSYLATED A1C: CPT

## 2021-11-12 PROCEDURE — 36415 COLL VENOUS BLD VENIPUNCTURE: CPT

## 2021-11-12 PROCEDURE — 94761 N-INVAS EAR/PLS OXIMETRY MLT: CPT

## 2021-11-12 PROCEDURE — 80074 ACUTE HEPATITIS PANEL: CPT

## 2021-11-12 PROCEDURE — 2500000003 HC RX 250 WO HCPCS: Performed by: NURSE PRACTITIONER

## 2021-11-12 PROCEDURE — 6370000000 HC RX 637 (ALT 250 FOR IP): Performed by: FAMILY MEDICINE

## 2021-11-12 PROCEDURE — 80053 COMPREHEN METABOLIC PANEL: CPT

## 2021-11-12 RX ADMIN — Medication 2000 UNITS: at 10:11

## 2021-11-12 RX ADMIN — ALBUTEROL SULFATE 2 PUFF: 90 AEROSOL, METERED RESPIRATORY (INHALATION) at 12:18

## 2021-11-12 RX ADMIN — ENOXAPARIN SODIUM 30 MG: 100 INJECTION SUBCUTANEOUS at 10:11

## 2021-11-12 RX ADMIN — IPRATROPIUM BROMIDE 2 PUFF: 17 AEROSOL, METERED RESPIRATORY (INHALATION) at 16:00

## 2021-11-12 RX ADMIN — IPRATROPIUM BROMIDE 2 PUFF: 17 AEROSOL, METERED RESPIRATORY (INHALATION) at 21:48

## 2021-11-12 RX ADMIN — FAMOTIDINE 20 MG: 20 TABLET, FILM COATED ORAL at 10:11

## 2021-11-12 RX ADMIN — REMDESIVIR 100 MG: 100 INJECTION, POWDER, LYOPHILIZED, FOR SOLUTION INTRAVENOUS at 14:35

## 2021-11-12 RX ADMIN — IPRATROPIUM BROMIDE 2 PUFF: 17 AEROSOL, METERED RESPIRATORY (INHALATION) at 12:17

## 2021-11-12 RX ADMIN — Medication 50 MG: at 10:11

## 2021-11-12 RX ADMIN — SODIUM CHLORIDE, PRESERVATIVE FREE 10 ML: 5 INJECTION INTRAVENOUS at 21:23

## 2021-11-12 RX ADMIN — BARICITINIB 4 MG: 2 TABLET, FILM COATED ORAL at 10:11

## 2021-11-12 RX ADMIN — FAMOTIDINE 20 MG: 20 TABLET, FILM COATED ORAL at 21:15

## 2021-11-12 RX ADMIN — ALBUTEROL SULFATE 2 PUFF: 90 AEROSOL, METERED RESPIRATORY (INHALATION) at 16:00

## 2021-11-12 RX ADMIN — OXYCODONE HYDROCHLORIDE AND ACETAMINOPHEN 500 MG: 500 TABLET ORAL at 10:11

## 2021-11-12 RX ADMIN — ENOXAPARIN SODIUM 30 MG: 100 INJECTION SUBCUTANEOUS at 21:14

## 2021-11-12 RX ADMIN — SODIUM CHLORIDE, PRESERVATIVE FREE 10 ML: 5 INJECTION INTRAVENOUS at 10:12

## 2021-11-12 RX ADMIN — ALBUTEROL SULFATE 2 PUFF: 90 AEROSOL, METERED RESPIRATORY (INHALATION) at 21:48

## 2021-11-12 RX ADMIN — DEXAMETHASONE SODIUM PHOSPHATE 6 MG: 4 INJECTION, SOLUTION INTRAMUSCULAR; INTRAVENOUS at 06:25

## 2021-11-12 NOTE — CARE COORDINATION
Chart reviewed for possible discharge needs. Per chart review, pt is from home and was independent PTA. Pt has PCP and insurance with RX coverage. Per nursing charting, pt is independent in room. CM anticipates pt will have no needs from CM at discharge. CM will continue to follow for possible needs and pt progress.

## 2021-11-12 NOTE — PROGRESS NOTES
.        Hospitalist Progress Note      Name:  Merly Varner /Age/Sex: 1995  (27 y.o. male)   MRN & CSN:  [id number] & [id number] Admission Date/Time: 2021  5:09 AM   Location:  -A PCP: Scarlet Barakat MD         Hospital Day: 2    Assessment and Plan:   Merly Varner is a 27 y.o.  male  who presents with <principal problem not specified>    1. Acute hypoxemic respiratory failure secondary to COVID-19 pneumonia. Continue Decadron, remdesivir. He is on 3 L nasal cannula oxygen. 2. Diarrhea. Improving. Stool C. difficile not assessed. 3. Transaminitis. Continue to monitor  4. Hyponatremia. Resolved. Diet ADULT DIET; Regular   DVT Prophylaxis [] Lovenox, []  Heparin, [] SCDs, [] Ambulation   GI Prophylaxis [] PPI,  [] H2 Blocker,  [] Carafate,  [] Diet/Tube Feeds   Code Status Full Code   Disposition Patient requires continued admission due to    MDM [] Low, [] Moderate,[]  High  Patient's risk as above due to      History of Present Illness:     Chief Complaint: <principal problem not specified>  Merly Varner is a 27 y.o.  male  who presented with sob, cough. Covid positive. Feels better today. Denies sob at rest. Denies chest pain, fever, chills, nausea, vomiting. No diarrhea this morning. Ten point ROS reviewed negative, unless as noted above    Objective: Intake/Output Summary (Last 24 hours) at 2021 1016  Last data filed at 2021 0522  Gross per 24 hour   Intake --   Output 800 ml   Net -800 ml      Vitals:   Vitals:    21 1000   BP: 111/68   Pulse: 81   Resp: 18   Temp: 100 °F (37.8 °C)   SpO2: 93%     Physical Exam:   GEN Awake male, sitting upright in bed in no apparent distress. Appears given age. EYES Pupils are equally round. No scleral erythema, discharge, or conjunctivitis. HENT Mucous membranes are moist. Oral pharynx without exudates, no evidence of thrush. NECK Supple, no apparent thyromegaly or masses. RESP Bilateral rhonchi. Symmetric chest movement. CARDIO/VASC S1/S2 auscultated. Regular rate without appreciable murmurs, rubs, or gallops. No JVD or carotid bruits. Peripheral pulses equal bilaterally and palpable. No peripheral edema. GI Abdomen is soft without significant tenderness, masses, or guarding. Bowel sounds are normoactive. Rectal exam deferred. HEME/LYMPH No palpable cervical lymphadenopathy and no hepatosplenomegaly. No petechiae or ecchymoses. MSK No gross joint deformities. SKIN Normal coloration, warm, dry. NEURO Cranial nerves appear grossly intact, normal speech, no lateralizing weakness. PSYCH Awake, alert, oriented x 4. Affect appropriate.     Medications:   Medications:    dexamethasone  6 mg IntraVENous Q24H    ascorbic acid  500 mg Oral Daily    zinc sulfate  50 mg Oral Daily    ipratropium  2 puff Inhalation Q4H    albuterol sulfate HFA  2 puff Inhalation Q4H    sodium chloride flush  5-40 mL IntraVENous 2 times per day    enoxaparin  30 mg SubCUTAneous BID    famotidine  20 mg Oral BID    vitamin D  2,000 Units Oral Daily    remdesivir IVPB  100 mg IntraVENous Q24H    baricitinib  4 mg Oral Daily      Infusions:    sodium chloride       PRN Meds: sodium chloride flush, 5-40 mL, PRN  sodium chloride, 25 mL, PRN  ondansetron, 4 mg, Q8H PRN   Or  ondansetron, 4 mg, Q6H PRN  polyethylene glycol, 17 g, Daily PRN  acetaminophen, 650 mg, Q6H PRN   Or  acetaminophen, 650 mg, Q6H PRN  guaiFENesin-dextromethorphan, 5 mL, Q4H PRN  sodium chloride, 30 mL, PRN  benzonatate, 100 mg, TID PRN  albuterol sulfate HFA, 2 puff, Q2H PRN   And  ipratropium, 1 puff, Q2H PRN            Electronically signed by Elieser Sanchez MD on 11/12/2021 at 10:16 AM

## 2021-11-13 LAB
ALBUMIN SERPL-MCNC: 3.4 GM/DL (ref 3.4–5)
ALP BLD-CCNC: 44 IU/L (ref 40–128)
ALT SERPL-CCNC: 108 U/L (ref 10–40)
ANION GAP SERPL CALCULATED.3IONS-SCNC: 15 MMOL/L (ref 4–16)
AST SERPL-CCNC: 84 IU/L (ref 15–37)
BASOPHILS ABSOLUTE: 0 K/CU MM
BASOPHILS RELATIVE PERCENT: 0.1 % (ref 0–1)
BILIRUB SERPL-MCNC: 0.6 MG/DL (ref 0–1)
BUN BLDV-MCNC: 17 MG/DL (ref 6–23)
CALCIUM SERPL-MCNC: 8.2 MG/DL (ref 8.3–10.6)
CHLORIDE BLD-SCNC: 100 MMOL/L (ref 99–110)
CO2: 24 MMOL/L (ref 21–32)
CREAT SERPL-MCNC: 0.7 MG/DL (ref 0.9–1.3)
D DIMER: 245 NG/ML(DDU)
DIFFERENTIAL TYPE: ABNORMAL
EOSINOPHILS ABSOLUTE: 0 K/CU MM
EOSINOPHILS RELATIVE PERCENT: 0 % (ref 0–3)
GFR AFRICAN AMERICAN: >60 ML/MIN/1.73M2
GFR NON-AFRICAN AMERICAN: >60 ML/MIN/1.73M2
GLUCOSE BLD-MCNC: 103 MG/DL (ref 70–99)
HCT VFR BLD CALC: 46.9 % (ref 42–52)
HEMOGLOBIN: 15.8 GM/DL (ref 13.5–18)
HIGH SENSITIVE C-REACTIVE PROTEIN: 71.6 MG/L
IMMATURE NEUTROPHIL %: 0.8 % (ref 0–0.43)
LYMPHOCYTES ABSOLUTE: 0.9 K/CU MM
LYMPHOCYTES RELATIVE PERCENT: 10.2 % (ref 24–44)
MCH RBC QN AUTO: 28.9 PG (ref 27–31)
MCHC RBC AUTO-ENTMCNC: 33.7 % (ref 32–36)
MCV RBC AUTO: 85.9 FL (ref 78–100)
MONOCYTES ABSOLUTE: 0.6 K/CU MM
MONOCYTES RELATIVE PERCENT: 6.9 % (ref 0–4)
NUCLEATED RBC %: 0 %
PDW BLD-RTO: 12.3 % (ref 11.7–14.9)
PLATELET # BLD: 325 K/CU MM (ref 140–440)
PMV BLD AUTO: 9.8 FL (ref 7.5–11.1)
POTASSIUM SERPL-SCNC: 3.8 MMOL/L (ref 3.5–5.1)
PROCALCITONIN: 0.11
RBC # BLD: 5.46 M/CU MM (ref 4.6–6.2)
SEGMENTED NEUTROPHILS ABSOLUTE COUNT: 7.2 K/CU MM
SEGMENTED NEUTROPHILS RELATIVE PERCENT: 82 % (ref 36–66)
SODIUM BLD-SCNC: 139 MMOL/L (ref 135–145)
TOTAL IMMATURE NEUTOROPHIL: 0.07 K/CU MM
TOTAL NUCLEATED RBC: 0 K/CU MM
TOTAL PROTEIN: 6.4 GM/DL (ref 6.4–8.2)
WBC # BLD: 8.7 K/CU MM (ref 4–10.5)

## 2021-11-13 PROCEDURE — 2700000000 HC OXYGEN THERAPY PER DAY

## 2021-11-13 PROCEDURE — 80053 COMPREHEN METABOLIC PANEL: CPT

## 2021-11-13 PROCEDURE — 6370000000 HC RX 637 (ALT 250 FOR IP): Performed by: FAMILY MEDICINE

## 2021-11-13 PROCEDURE — 85379 FIBRIN DEGRADATION QUANT: CPT

## 2021-11-13 PROCEDURE — 94640 AIRWAY INHALATION TREATMENT: CPT

## 2021-11-13 PROCEDURE — 6370000000 HC RX 637 (ALT 250 FOR IP): Performed by: NURSE PRACTITIONER

## 2021-11-13 PROCEDURE — 86141 C-REACTIVE PROTEIN HS: CPT

## 2021-11-13 PROCEDURE — 6360000002 HC RX W HCPCS: Performed by: NURSE PRACTITIONER

## 2021-11-13 PROCEDURE — 2580000003 HC RX 258: Performed by: NURSE PRACTITIONER

## 2021-11-13 PROCEDURE — 1200000000 HC SEMI PRIVATE

## 2021-11-13 PROCEDURE — 36415 COLL VENOUS BLD VENIPUNCTURE: CPT

## 2021-11-13 PROCEDURE — 2500000003 HC RX 250 WO HCPCS: Performed by: NURSE PRACTITIONER

## 2021-11-13 PROCEDURE — 84145 PROCALCITONIN (PCT): CPT

## 2021-11-13 PROCEDURE — 85025 COMPLETE CBC W/AUTO DIFF WBC: CPT

## 2021-11-13 PROCEDURE — 94761 N-INVAS EAR/PLS OXIMETRY MLT: CPT

## 2021-11-13 PROCEDURE — 6360000002 HC RX W HCPCS: Performed by: FAMILY MEDICINE

## 2021-11-13 RX ADMIN — ALBUTEROL SULFATE 2 PUFF: 90 AEROSOL, METERED RESPIRATORY (INHALATION) at 01:06

## 2021-11-13 RX ADMIN — IPRATROPIUM BROMIDE 2 PUFF: 17 AEROSOL, METERED RESPIRATORY (INHALATION) at 01:07

## 2021-11-13 RX ADMIN — ALBUTEROL SULFATE 2 PUFF: 90 AEROSOL, METERED RESPIRATORY (INHALATION) at 23:45

## 2021-11-13 RX ADMIN — IPRATROPIUM BROMIDE 2 PUFF: 17 AEROSOL, METERED RESPIRATORY (INHALATION) at 20:02

## 2021-11-13 RX ADMIN — SODIUM CHLORIDE, PRESERVATIVE FREE 5 ML: 5 INJECTION INTRAVENOUS at 20:24

## 2021-11-13 RX ADMIN — Medication 50 MG: at 09:56

## 2021-11-13 RX ADMIN — ENOXAPARIN SODIUM 30 MG: 100 INJECTION SUBCUTANEOUS at 09:56

## 2021-11-13 RX ADMIN — DEXAMETHASONE SODIUM PHOSPHATE 6 MG: 4 INJECTION, SOLUTION INTRAMUSCULAR; INTRAVENOUS at 06:31

## 2021-11-13 RX ADMIN — FAMOTIDINE 20 MG: 20 TABLET, FILM COATED ORAL at 09:56

## 2021-11-13 RX ADMIN — OXYCODONE HYDROCHLORIDE AND ACETAMINOPHEN 500 MG: 500 TABLET ORAL at 09:56

## 2021-11-13 RX ADMIN — SODIUM CHLORIDE, PRESERVATIVE FREE 10 ML: 5 INJECTION INTRAVENOUS at 09:59

## 2021-11-13 RX ADMIN — BARICITINIB 4 MG: 2 TABLET, FILM COATED ORAL at 09:56

## 2021-11-13 RX ADMIN — IPRATROPIUM BROMIDE 2 PUFF: 17 AEROSOL, METERED RESPIRATORY (INHALATION) at 09:13

## 2021-11-13 RX ADMIN — IPRATROPIUM BROMIDE 2 PUFF: 17 AEROSOL, METERED RESPIRATORY (INHALATION) at 23:45

## 2021-11-13 RX ADMIN — REMDESIVIR 100 MG: 100 INJECTION, POWDER, LYOPHILIZED, FOR SOLUTION INTRAVENOUS at 13:55

## 2021-11-13 RX ADMIN — ALBUTEROL SULFATE 2 PUFF: 90 AEROSOL, METERED RESPIRATORY (INHALATION) at 09:13

## 2021-11-13 RX ADMIN — ENOXAPARIN SODIUM 30 MG: 100 INJECTION SUBCUTANEOUS at 20:24

## 2021-11-13 RX ADMIN — Medication 2000 UNITS: at 09:56

## 2021-11-13 RX ADMIN — FAMOTIDINE 20 MG: 20 TABLET, FILM COATED ORAL at 20:24

## 2021-11-13 RX ADMIN — ALBUTEROL SULFATE 2 PUFF: 90 AEROSOL, METERED RESPIRATORY (INHALATION) at 20:02

## 2021-11-13 NOTE — PROGRESS NOTES
Manuela Arteaga MD, [place]                Internal Medicine Hospitalist             Daily Progress  Note   Subjective:     Chief Complaint   Patient presents with    Shortness of Breath     Tested + for covid 11/4/21      Mr. Oksana Hicks Complains of nil, resting comfortably in bed on oxygen. Objective:    /75   Pulse 74   Temp 98.2 °F (36.8 °C) (Oral)   Resp (!) 31   Ht 6' (1.829 m)   Wt 267 lb 4.8 oz (121.2 kg)   SpO2 (!) 88%   BMI 36.25 kg/m²    No intake or output data in the 24 hours ending 11/13/21 0735   Physical Exam:  Heart:  Regular rate and rhythm, normal S1 and S2 in all 4 auscultatory areas. No rubs  Murmurs or gallops heard. Lungs: Mostly clear to auscultation, decreased breath sounds at bases. No wheezes appreciated no crackles heard. Moderate effort  Abdomen: Soft, non distended. Bowel sounds appreciated. No obvious liver or spleen enlargement. Non tender, no rebound noted. Extremities: Non tender, no swelling noted, strength 5/5 both legs. CNS: Grossly intact.     Labs:  CBC with Differential:    Lab Results   Component Value Date    WBC 8.7 11/13/2021    RBC 5.46 11/13/2021    HGB 15.8 11/13/2021    HCT 46.9 11/13/2021     11/13/2021    MCV 85.9 11/13/2021    MCH 28.9 11/13/2021    MCHC 33.7 11/13/2021    RDW 12.3 11/13/2021    SEGSPCT 82.0 11/13/2021    LYMPHOPCT 10.2 11/13/2021    MONOPCT 6.9 11/13/2021    BASOPCT 0.1 11/13/2021    MONOSABS 0.6 11/13/2021    LYMPHSABS 0.9 11/13/2021    EOSABS 0.0 11/13/2021    BASOSABS 0.0 11/13/2021    DIFFTYPE AUTOMATED DIFFERENTIAL 11/13/2021     CMP:    Lab Results   Component Value Date     11/12/2021    K 3.8 11/12/2021    CL 97 11/12/2021    CO2 24 11/12/2021    BUN 16 11/12/2021    CREATININE 0.9 11/12/2021    GFRAA >60 11/12/2021    LABGLOM >60 11/12/2021    GLUCOSE 111 11/12/2021    PROT 6.4 11/12/2021    LABALBU 3.4 11/12/2021    CALCIUM 8.4 11/12/2021    BILITOT 0.4 11/12/2021    ALKPHOS 41 11/12/2021    AST 65 11/12/2021 ALT 64 11/12/2021     No results for input(s): TROPONINT in the last 72 hours. No results found for: TSHHS      sodium chloride        dexamethasone  6 mg IntraVENous Q24H    ascorbic acid  500 mg Oral Daily    zinc sulfate  50 mg Oral Daily    ipratropium  2 puff Inhalation Q4H    albuterol sulfate HFA  2 puff Inhalation Q4H    sodium chloride flush  5-40 mL IntraVENous 2 times per day    enoxaparin  30 mg SubCUTAneous BID    famotidine  20 mg Oral BID    vitamin D  2,000 Units Oral Daily    remdesivir IVPB  100 mg IntraVENous Q24H    baricitinib  4 mg Oral Daily         Assessment:       Patient Active Problem List    Diagnosis Date Noted    Pneumonia due to COVID-19 virus 11/11/2021       Plan:     Problems being addressed this admission:   # Covid -19 pneumonia with Acute Respiratory failure - CXR noted for patchy bilateral infiltrates. Had outpt +Covid testing. Covid PCR pending. Will check influenza A/B and pro-Yousif.  Pharmacy consultation for initiation Remdesivir-monitor LFT's, IV Decadron, Scheduled MDI's, IV antibiotic pending pro-Yousif, Supplemental O2, continuous pulse ox montioring, adjunctive medications, anti-tussive/expectorant medications. COVID labs, Respiratory/contact isolation. Monitor closely  Acute hypoxic respiratory failure due to COVID 19 - Pt not on home O2. Currently requiring 2l per nc. POC as noted above, monitor blood gases and respiratory status, monitor need for Vapotherm/BIPAP/intubation. 11/13/2021-he seems to be responding well to the treatment plan requires high flow oxygen continue to monitor. His influenza AMB was negative. Patient was not vaccinated for COVID-19 he plans to be now. # Diarrhea in setting of Covid -19 - Can check for Cdiff if diarrhea continues  11/13/2021-C. difficile testing is still awaited, continues to have some diarrhea but not any worse. His labs are not back from today. # Transaminitis - Normal bili.  Denies abd pain, check acute hep panel  11/13/2021-his hepatitis panel was negative. # Hyperglycemia - suspect stress induced, will check A1C in am, monitor  11/13/2021-sugar yesterday was 111 hemoglobin A1c 6.0 he is not diabetic    # Mild hyponatremia - Encourage oral hydration for now, caution with IV hydration in COVID -19 patients  11/13/2021-his hyponatremia resolved continue to monitor. Consultants:  None    General Orders:  Repeat basic labs again in am.  I have explained to the patient and discussed with him/her the treatment plan. Also discussed with RN. The above chart was generated partly using Dragon dictation system, it may contain dictation errors given the limitations of this technology.      Israel Dye MD, [address]

## 2021-11-14 LAB
ALBUMIN SERPL-MCNC: 3.6 GM/DL (ref 3.4–5)
ALP BLD-CCNC: 48 IU/L (ref 40–128)
ALT SERPL-CCNC: 197 U/L (ref 10–40)
ANION GAP SERPL CALCULATED.3IONS-SCNC: 13 MMOL/L (ref 4–16)
AST SERPL-CCNC: 102 IU/L (ref 15–37)
BASOPHILS ABSOLUTE: 0 K/CU MM
BASOPHILS RELATIVE PERCENT: 0.1 % (ref 0–1)
BILIRUB SERPL-MCNC: 0.6 MG/DL (ref 0–1)
BUN BLDV-MCNC: 18 MG/DL (ref 6–23)
C DIFF AG + TOXIN: NORMAL
CALCIUM SERPL-MCNC: 8.6 MG/DL (ref 8.3–10.6)
CHLORIDE BLD-SCNC: 101 MMOL/L (ref 99–110)
CO2: 27 MMOL/L (ref 21–32)
CREAT SERPL-MCNC: 0.7 MG/DL (ref 0.9–1.3)
DIFFERENTIAL TYPE: ABNORMAL
EOSINOPHILS ABSOLUTE: 0 K/CU MM
EOSINOPHILS RELATIVE PERCENT: 0.1 % (ref 0–3)
GFR AFRICAN AMERICAN: >60 ML/MIN/1.73M2
GFR NON-AFRICAN AMERICAN: >60 ML/MIN/1.73M2
GLUCOSE BLD-MCNC: 102 MG/DL (ref 70–99)
HCT VFR BLD CALC: 49.5 % (ref 42–52)
HEMOGLOBIN: 16.6 GM/DL (ref 13.5–18)
IMMATURE NEUTROPHIL %: 1.2 % (ref 0–0.43)
LYMPHOCYTES ABSOLUTE: 0.8 K/CU MM
LYMPHOCYTES RELATIVE PERCENT: 9.3 % (ref 24–44)
MCH RBC QN AUTO: 29.3 PG (ref 27–31)
MCHC RBC AUTO-ENTMCNC: 33.5 % (ref 32–36)
MCV RBC AUTO: 87.3 FL (ref 78–100)
MONOCYTES ABSOLUTE: 0.7 K/CU MM
MONOCYTES RELATIVE PERCENT: 8.2 % (ref 0–4)
NUCLEATED RBC %: 0 %
PDW BLD-RTO: 12.3 % (ref 11.7–14.9)
PLATELET # BLD: 352 K/CU MM (ref 140–440)
PMV BLD AUTO: 9.6 FL (ref 7.5–11.1)
POTASSIUM SERPL-SCNC: 4.4 MMOL/L (ref 3.5–5.1)
RBC # BLD: 5.67 M/CU MM (ref 4.6–6.2)
SEGMENTED NEUTROPHILS ABSOLUTE COUNT: 7.2 K/CU MM
SEGMENTED NEUTROPHILS RELATIVE PERCENT: 81.1 % (ref 36–66)
SODIUM BLD-SCNC: 141 MMOL/L (ref 135–145)
SOURCE: NORMAL
TOTAL IMMATURE NEUTOROPHIL: 0.11 K/CU MM
TOTAL NUCLEATED RBC: 0 K/CU MM
TOTAL PROTEIN: 6.5 GM/DL (ref 6.4–8.2)
WBC # BLD: 8.9 K/CU MM (ref 4–10.5)

## 2021-11-14 PROCEDURE — 36415 COLL VENOUS BLD VENIPUNCTURE: CPT

## 2021-11-14 PROCEDURE — 6360000002 HC RX W HCPCS: Performed by: NURSE PRACTITIONER

## 2021-11-14 PROCEDURE — 6370000000 HC RX 637 (ALT 250 FOR IP): Performed by: FAMILY MEDICINE

## 2021-11-14 PROCEDURE — 6370000000 HC RX 637 (ALT 250 FOR IP): Performed by: NURSE PRACTITIONER

## 2021-11-14 PROCEDURE — 2580000003 HC RX 258: Performed by: NURSE PRACTITIONER

## 2021-11-14 PROCEDURE — 85025 COMPLETE CBC W/AUTO DIFF WBC: CPT

## 2021-11-14 PROCEDURE — 1200000000 HC SEMI PRIVATE

## 2021-11-14 PROCEDURE — 94640 AIRWAY INHALATION TREATMENT: CPT

## 2021-11-14 PROCEDURE — 80053 COMPREHEN METABOLIC PANEL: CPT

## 2021-11-14 PROCEDURE — 6360000002 HC RX W HCPCS: Performed by: FAMILY MEDICINE

## 2021-11-14 PROCEDURE — 2500000003 HC RX 250 WO HCPCS: Performed by: NURSE PRACTITIONER

## 2021-11-14 RX ORDER — LOPERAMIDE HYDROCHLORIDE 2 MG/1
2 CAPSULE ORAL EVERY 6 HOURS PRN
Status: DISCONTINUED | OUTPATIENT
Start: 2021-11-14 | End: 2021-11-16 | Stop reason: HOSPADM

## 2021-11-14 RX ADMIN — SODIUM CHLORIDE, PRESERVATIVE FREE 10 ML: 5 INJECTION INTRAVENOUS at 18:22

## 2021-11-14 RX ADMIN — SODIUM CHLORIDE, PRESERVATIVE FREE 10 ML: 5 INJECTION INTRAVENOUS at 19:51

## 2021-11-14 RX ADMIN — OXYCODONE HYDROCHLORIDE AND ACETAMINOPHEN 500 MG: 500 TABLET ORAL at 09:40

## 2021-11-14 RX ADMIN — ALBUTEROL SULFATE 2 PUFF: 90 AEROSOL, METERED RESPIRATORY (INHALATION) at 19:32

## 2021-11-14 RX ADMIN — BARICITINIB 4 MG: 2 TABLET, FILM COATED ORAL at 09:40

## 2021-11-14 RX ADMIN — IPRATROPIUM BROMIDE 2 PUFF: 17 AEROSOL, METERED RESPIRATORY (INHALATION) at 19:32

## 2021-11-14 RX ADMIN — Medication 2000 UNITS: at 09:40

## 2021-11-14 RX ADMIN — ENOXAPARIN SODIUM 30 MG: 100 INJECTION SUBCUTANEOUS at 19:46

## 2021-11-14 RX ADMIN — REMDESIVIR 100 MG: 100 INJECTION, POWDER, LYOPHILIZED, FOR SOLUTION INTRAVENOUS at 14:49

## 2021-11-14 RX ADMIN — FAMOTIDINE 20 MG: 20 TABLET, FILM COATED ORAL at 09:40

## 2021-11-14 RX ADMIN — DEXAMETHASONE SODIUM PHOSPHATE 6 MG: 4 INJECTION, SOLUTION INTRAMUSCULAR; INTRAVENOUS at 06:14

## 2021-11-14 RX ADMIN — Medication 50 MG: at 09:40

## 2021-11-14 RX ADMIN — FAMOTIDINE 20 MG: 20 TABLET, FILM COATED ORAL at 19:45

## 2021-11-14 RX ADMIN — ENOXAPARIN SODIUM 30 MG: 100 INJECTION SUBCUTANEOUS at 09:40

## 2021-11-14 NOTE — PROGRESS NOTES
Consult received for elevated transaminases, which is common with Covid 19 infection and baracitinib Rx. Prior to this illness he had minimally increased ALT.  His acute hepatitis panel is negative and he has no prior suggestion of advanced liver disease    Suggest:   1) monitor LFT's    2) if LFT's continue to rise significantly may need to D/C baracitinib but would not do it now

## 2021-11-14 NOTE — PROGRESS NOTES
Renny Salas MD, [address]                Internal Medicine Hospitalist             Daily Progress  Note   Subjective:     Chief Complaint   Patient presents with    Shortness of Breath     Tested + for covid 11/4/21      Mr. Yaneth Staley Complains of nil, still requiring increased amounts of oxygen. Objective:    BP (!) 114/57   Pulse 77   Temp 98.2 °F (36.8 °C) (Oral)   Resp 21   Ht 6' (1.829 m)   Wt 267 lb 4.8 oz (121.2 kg)   SpO2 91%   BMI 36.25 kg/m²      Intake/Output Summary (Last 24 hours) at 11/14/2021 0759  Last data filed at 11/14/2021 0200  Gross per 24 hour   Intake 100 ml   Output 1050 ml   Net -950 ml      Physical Exam:  Heart: Distant heart sounds  Lungs: Mostly clear to auscultation, decreased breath sounds at bases. No wheezes appreciated no crackles heard. Difficult to evaluate with poor quality disposable stethoscope  Abdomen: Soft, non distended. Bowel sounds not appreciated. No obvious liver or spleen enlargement. Non tender, no rebound noted. Extremities: Non tender, no swelling noted, strength 5/5 both legs. CNS: Grossly intact.     Labs:  CBC with Differential:    Lab Results   Component Value Date    WBC 8.9 11/14/2021    RBC 5.67 11/14/2021    HGB 16.6 11/14/2021    HCT 49.5 11/14/2021     11/14/2021    MCV 87.3 11/14/2021    MCH 29.3 11/14/2021    MCHC 33.5 11/14/2021    RDW 12.3 11/14/2021    SEGSPCT 81.1 11/14/2021    LYMPHOPCT 9.3 11/14/2021    MONOPCT 8.2 11/14/2021    BASOPCT 0.1 11/14/2021    MONOSABS 0.7 11/14/2021    LYMPHSABS 0.8 11/14/2021    EOSABS 0.0 11/14/2021    BASOSABS 0.0 11/14/2021    DIFFTYPE AUTOMATED DIFFERENTIAL 11/14/2021     CMP:    Lab Results   Component Value Date     11/13/2021    K 3.8 11/13/2021     11/13/2021    CO2 24 11/13/2021    BUN 17 11/13/2021    CREATININE 0.7 11/13/2021    GFRAA >60 11/13/2021    LABGLOM >60 11/13/2021    GLUCOSE 103 11/13/2021    PROT 6.4 11/13/2021    LABALBU 3.4 11/13/2021    CALCIUM 8.2 11/13/2021 BILITOT 0.6 11/13/2021    ALKPHOS 44 11/13/2021    AST 84 11/13/2021     11/13/2021     No results for input(s): TROPONINT in the last 72 hours. No results found for: TSHHS      sodium chloride        dexamethasone  6 mg IntraVENous Q24H    ascorbic acid  500 mg Oral Daily    zinc sulfate  50 mg Oral Daily    ipratropium  2 puff Inhalation Q4H    albuterol sulfate HFA  2 puff Inhalation Q4H    sodium chloride flush  5-40 mL IntraVENous 2 times per day    enoxaparin  30 mg SubCUTAneous BID    famotidine  20 mg Oral BID    vitamin D  2,000 Units Oral Daily    remdesivir IVPB  100 mg IntraVENous Q24H    baricitinib  4 mg Oral Daily         Assessment:       Patient Active Problem List    Diagnosis Date Noted    Pneumonia due to COVID-19 virus 11/11/2021       Plan:     Problems being addressed this admission:   # Covid -19 pneumonia with Acute Respiratory failure   11/12/2021- CXR noted for patchy bilateral infiltrates. Had outpt +Covid testing. Covid PCR pending. Will check influenza A/B and pro-Yousif.  Pharmacy consultation for initiation Remdesivir-monitor LFT's, IV Decadron, Scheduled MDI's, IV antibiotic pending pro-Yousif, Supplemental O2, continuous pulse ox montioring, adjunctive medications, anti-tussive/expectorant medications. COVID labs, Respiratory/contact isolation. Monitor closely  Acute hypoxic respiratory failure due to COVID 19 - Pt not on home O2. Currently requiring 2l per nc. POC as noted above, monitor blood gases and respiratory status, monitor need for Vapotherm/BIPAP/intubation. 11/13/2021-he seems to be responding well to the treatment plan requires high flow oxygen continue to monitor. His influenza A&B was negative. Patient was not vaccinated for COVID-19 he plans to be now. 11/14/2021-continue as before if he continues to need high flow oxygen may need to have pulmonary involved tomorrow.   Meanwhile he continues to be on baricitinib 4 mg daily Decadron 6 mg IV daily remdesivir 100 mg daily     # Diarrhea in setting of Covid -19   11/12/2021- Can check for Cdiff if diarrhea continues  11/13/2021-C. difficile testing is still awaited, continues to have some diarrhea but not any worse. His labs are not back from today. 11/14/2021-did not complain of diarrhea.     # Transaminitis   11/12/2021- Normal bili. Denies abd pain, check acute hep panel  11/13/2021-his hepatitis panel was negative. 11/14/2021-his liver enzymes were worse yesterday from before we will consult GI.     # Hyperglycemia   11/13/2021- suspect stress induced, will check A1C in am, monitor -sugar yesterday was 111 hemoglobin A1c 6.0 he is not diabetic     # Mild hyponatremia   11/12/2021- Encourage oral hydration for now, caution with IV hydration in COVID -19 patients  11/13/2021-his hyponatremia resolved continue to monitor.     Consultants:  GI    General Orders:  Repeat basic labs again in am.  I have explained to the patient and discussed with him/her the treatment plan. The above chart was generated partly using Dragon dictation system, it may contain dictation errors given the limitations of this technology.      Briana Francisco MD, [facility]

## 2021-11-15 LAB
ALBUMIN SERPL-MCNC: 3.4 GM/DL (ref 3.4–5)
ALP BLD-CCNC: 49 IU/L (ref 40–128)
ALT SERPL-CCNC: 213 U/L (ref 10–40)
ANION GAP SERPL CALCULATED.3IONS-SCNC: 11 MMOL/L (ref 4–16)
AST SERPL-CCNC: 61 IU/L (ref 15–37)
BASOPHILS ABSOLUTE: 0 K/CU MM
BASOPHILS RELATIVE PERCENT: 0.1 % (ref 0–1)
BILIRUB SERPL-MCNC: 0.6 MG/DL (ref 0–1)
BUN BLDV-MCNC: 17 MG/DL (ref 6–23)
CALCIUM SERPL-MCNC: 8.4 MG/DL (ref 8.3–10.6)
CHLORIDE BLD-SCNC: 103 MMOL/L (ref 99–110)
CO2: 25 MMOL/L (ref 21–32)
CREAT SERPL-MCNC: 0.7 MG/DL (ref 0.9–1.3)
DIFFERENTIAL TYPE: ABNORMAL
EOSINOPHILS ABSOLUTE: 0 K/CU MM
EOSINOPHILS RELATIVE PERCENT: 0.1 % (ref 0–3)
GFR AFRICAN AMERICAN: >60 ML/MIN/1.73M2
GFR NON-AFRICAN AMERICAN: >60 ML/MIN/1.73M2
GLUCOSE BLD-MCNC: 111 MG/DL (ref 70–99)
HCT VFR BLD CALC: 48.2 % (ref 42–52)
HEMOGLOBIN: 16.1 GM/DL (ref 13.5–18)
IMMATURE NEUTROPHIL %: 2.1 % (ref 0–0.43)
LYMPHOCYTES ABSOLUTE: 0.5 K/CU MM
LYMPHOCYTES RELATIVE PERCENT: 6.2 % (ref 24–44)
MCH RBC QN AUTO: 29.1 PG (ref 27–31)
MCHC RBC AUTO-ENTMCNC: 33.4 % (ref 32–36)
MCV RBC AUTO: 87 FL (ref 78–100)
MONOCYTES ABSOLUTE: 0.6 K/CU MM
MONOCYTES RELATIVE PERCENT: 8.2 % (ref 0–4)
NUCLEATED RBC %: 0 %
PDW BLD-RTO: 12.4 % (ref 11.7–14.9)
PLATELET # BLD: 426 K/CU MM (ref 140–440)
PMV BLD AUTO: 9.5 FL (ref 7.5–11.1)
POTASSIUM SERPL-SCNC: 4.6 MMOL/L (ref 3.5–5.1)
RBC # BLD: 5.54 M/CU MM (ref 4.6–6.2)
SEGMENTED NEUTROPHILS ABSOLUTE COUNT: 6 K/CU MM
SEGMENTED NEUTROPHILS RELATIVE PERCENT: 83.3 % (ref 36–66)
SODIUM BLD-SCNC: 139 MMOL/L (ref 135–145)
TOTAL IMMATURE NEUTOROPHIL: 0.15 K/CU MM
TOTAL NUCLEATED RBC: 0 K/CU MM
TOTAL PROTEIN: 6.4 GM/DL (ref 6.4–8.2)
WBC # BLD: 7.2 K/CU MM (ref 4–10.5)

## 2021-11-15 PROCEDURE — 80053 COMPREHEN METABOLIC PANEL: CPT

## 2021-11-15 PROCEDURE — 1200000000 HC SEMI PRIVATE

## 2021-11-15 PROCEDURE — 6370000000 HC RX 637 (ALT 250 FOR IP): Performed by: SPECIALIST

## 2021-11-15 PROCEDURE — 6370000000 HC RX 637 (ALT 250 FOR IP): Performed by: NURSE PRACTITIONER

## 2021-11-15 PROCEDURE — 2580000003 HC RX 258: Performed by: NURSE PRACTITIONER

## 2021-11-15 PROCEDURE — 99223 1ST HOSP IP/OBS HIGH 75: CPT | Performed by: SPECIALIST

## 2021-11-15 PROCEDURE — 6360000002 HC RX W HCPCS: Performed by: FAMILY MEDICINE

## 2021-11-15 PROCEDURE — 85025 COMPLETE CBC W/AUTO DIFF WBC: CPT

## 2021-11-15 PROCEDURE — 6360000002 HC RX W HCPCS: Performed by: NURSE PRACTITIONER

## 2021-11-15 PROCEDURE — 2700000000 HC OXYGEN THERAPY PER DAY

## 2021-11-15 PROCEDURE — 36415 COLL VENOUS BLD VENIPUNCTURE: CPT

## 2021-11-15 PROCEDURE — 94761 N-INVAS EAR/PLS OXIMETRY MLT: CPT

## 2021-11-15 PROCEDURE — 2500000003 HC RX 250 WO HCPCS: Performed by: NURSE PRACTITIONER

## 2021-11-15 PROCEDURE — 94640 AIRWAY INHALATION TREATMENT: CPT

## 2021-11-15 PROCEDURE — 6370000000 HC RX 637 (ALT 250 FOR IP): Performed by: FAMILY MEDICINE

## 2021-11-15 RX ORDER — ALBUTEROL SULFATE 90 UG/1
2 AEROSOL, METERED RESPIRATORY (INHALATION) 2 TIMES DAILY
Status: DISCONTINUED | OUTPATIENT
Start: 2021-11-16 | End: 2021-11-16 | Stop reason: HOSPADM

## 2021-11-15 RX ADMIN — FAMOTIDINE 20 MG: 20 TABLET, FILM COATED ORAL at 20:36

## 2021-11-15 RX ADMIN — ENOXAPARIN SODIUM 30 MG: 100 INJECTION SUBCUTANEOUS at 20:36

## 2021-11-15 RX ADMIN — SODIUM CHLORIDE, PRESERVATIVE FREE 10 ML: 5 INJECTION INTRAVENOUS at 12:21

## 2021-11-15 RX ADMIN — Medication 2000 UNITS: at 08:59

## 2021-11-15 RX ADMIN — SODIUM CHLORIDE, PRESERVATIVE FREE 10 ML: 5 INJECTION INTRAVENOUS at 20:37

## 2021-11-15 RX ADMIN — REMDESIVIR 100 MG: 100 INJECTION, POWDER, LYOPHILIZED, FOR SOLUTION INTRAVENOUS at 16:49

## 2021-11-15 RX ADMIN — ENOXAPARIN SODIUM 30 MG: 100 INJECTION SUBCUTANEOUS at 08:59

## 2021-11-15 RX ADMIN — BARICITINIB 4 MG: 2 TABLET, FILM COATED ORAL at 08:59

## 2021-11-15 RX ADMIN — LOPERAMIDE HYDROCHLORIDE 2 MG: 2 CAPSULE ORAL at 12:19

## 2021-11-15 RX ADMIN — Medication 50 MG: at 08:59

## 2021-11-15 RX ADMIN — ALBUTEROL SULFATE 2 PUFF: 90 AEROSOL, METERED RESPIRATORY (INHALATION) at 09:04

## 2021-11-15 RX ADMIN — IPRATROPIUM BROMIDE 2 PUFF: 17 AEROSOL, METERED RESPIRATORY (INHALATION) at 12:42

## 2021-11-15 RX ADMIN — ALBUTEROL SULFATE 2 PUFF: 90 AEROSOL, METERED RESPIRATORY (INHALATION) at 12:42

## 2021-11-15 RX ADMIN — ALBUTEROL SULFATE 2 PUFF: 90 AEROSOL, METERED RESPIRATORY (INHALATION) at 17:10

## 2021-11-15 RX ADMIN — DEXAMETHASONE SODIUM PHOSPHATE 6 MG: 4 INJECTION, SOLUTION INTRAMUSCULAR; INTRAVENOUS at 08:59

## 2021-11-15 RX ADMIN — IPRATROPIUM BROMIDE 2 PUFF: 17 AEROSOL, METERED RESPIRATORY (INHALATION) at 17:10

## 2021-11-15 RX ADMIN — IPRATROPIUM BROMIDE 2 PUFF: 17 AEROSOL, METERED RESPIRATORY (INHALATION) at 09:04

## 2021-11-15 RX ADMIN — OXYCODONE HYDROCHLORIDE AND ACETAMINOPHEN 500 MG: 500 TABLET ORAL at 08:59

## 2021-11-15 RX ADMIN — LOPERAMIDE HYDROCHLORIDE 2 MG: 2 CAPSULE ORAL at 23:47

## 2021-11-15 RX ADMIN — FAMOTIDINE 20 MG: 20 TABLET, FILM COATED ORAL at 08:59

## 2021-11-15 NOTE — PROGRESS NOTES
11/15/21 1246   Oxygen Therapy/Pulse Ox   O2 Therapy Oxygen   O2 Device High flow nasal cannula   O2 Flow Rate (L/min) 3 L/min   Resp 25   SpO2 98 %   Pulse Oximeter Device Mode Continuous   Pulse Oximeter Device Location Finger

## 2021-11-15 NOTE — RT PROTOCOL NOTE
wheezing or increased work of breathing using Per Protocol order mode. 4-6 - enter or revise RT Bronchodilator order(s) to two equivalent RT bronchodilator orders with one order with BID Frequency and one order with Frequency of every 4 hours PRN wheezing or increased work of breathing using Per Protocol order mode. 7-10 - enter or revise RT Bronchodilator order(s) to two equivalent RT bronchodilator orders with one order with TID Frequency and one order with Frequency of every 4 hours PRN wheezing or increased work of breathing using Per Protocol order mode. 11-13 - enter or revise RT Bronchodilator order(s) to one equivalent RT bronchodilator order with QID Frequency and an Albuterol order with Frequency of every 4 hours PRN wheezing or increased work of breathing using Per Protocol order mode. Greater than 13 - enter or revise RT Bronchodilator order(s) to one equivalent RT bronchodilator order with every 4 hours Frequency and an Albuterol order with Frequency of every 2 hours PRN wheezing or increased work of breathing using Per Protocol order mode. RT to enter RT Home Evaluation for COPD & MDI Assessment order using Per Protocol order mode.     Electronically signed by Rudi Garcia RCP on 11/15/2021 at 5:21 PM

## 2021-11-15 NOTE — CONSULTS
Department of Internal Medicine  Gastroenterology Consult Note  Paula Felicia Arrington MD      Reason for Consult:  Elevated LFT's    Primary Care Physician:  Victorino Lopes MD    History Obtained From:  patient    HISTORY OF PRESENT ILLNESS:              The patient is a 27 y.o.  male admitted with Covid pneumonia. His LFT's have gradually increased since admission and I was asked to see him for that. He has no past liver history and does not drink alcohol. Acute hepatitis panel is negative. He has had some diarrhea since the onset of the Covid The patient denies abdominal pain,nausea,vomiting, constipation,melena,hematochezia,hematemesis,  weight loss or dysphagia          Past Medical History:    History reviewed. No pertinent past medical history. Past Surgical History:        Procedure Laterality Date    EXTERNAL EAR SURGERY      INNER EAR SURGERY      TYMPANOSTOMY TUBE PLACEMENT         Medications Prior to Admission:    Prior to Admission medications    Not on File       Allergies:  No Known Allergies. Social History:    TOBACCO:  No  ETOH:  No    Family History:   History reviewed. No pertinent family history. REVIEW OF SYSTEMS: (POSITIVES WILL BE UNDERLINED)  CONSTITUTIONAL:    Weight change,fatigue, fever, chills  EYES:  Diplopia, change in vision  EARS:  hearing loss, tinnitus, vertigo  NOSE:   epistaxis  MOUTH/THROAT:     hoarseness, sore throat. RESPIRATORY:  SOB,  cough, sputum, hemoptysis  CARDIOVASCULAR : chest pain,palpitations, dyspnea exertion, orthopnea, paroxysmal nocturnal dyspnea, pedal edema. GASTROINTESTINAL:  See HPI  GENITOURINARY:   dysuria, hematuria, . HEMATOLOGIC/LYMPHATIC:   Anemia, bleeding tendencies.   MUSCULOSKELETAL:    myalgias,  joint pain  NEUROLOGICAL:   Loss of Consciousness, paresthesias, anesthesias, focal weakness  SKIN :   History of dermatitis, rashes  PSYCHIATRIC:  depression, , anxiety past psychosis  ENDOCRINE:  History of diabetes, thyroid disease  ALL/IMM : allergies, rashes    PHYSICAL EXAM:    Vitals:  BP (!) 130/95   Pulse 79   Temp 98 °F (36.7 °C)   Resp 28   Ht 6' (1.829 m)   Wt 267 lb 4.8 oz (121.2 kg)   SpO2 94%   BMI 36.25 kg/m²   CONSTITUTIONAL: alert, cooperative, no apparent distress,   EYES:  pupils equal, round and reactive to light and sclera clear  ENT:  normocepalic, without obvious abnormality  NECK:  supple, symmetrical, trachea midline  HEMATOLOGIC/LYMPHATICS:  no cervical lymphadenopathy and no supraclavicular lymphadenopathy  LUNGS:  clear to auscultation  CARDIOVASCULAR:  regular rate and rhythm and no murmur noted  ABDOMEN:  Soft, non-tender with normal bowel sounds. No organomegaly or masses  NEUROLOGIC: no focal deficit detected  SKIN:  no lesions  EXTREMITIES: no clubbing, cyanosis, or edema    IMPRESSION:  1) Covid pneumonia  2) elevated LFT's- likely secondary to Covid- possible due to baracitinib  3) diarrhea- also likely Covid related    RECOMMENDATIONS:  1) symptomatic Rx for the diarrhea  2) continue to monitor LFT's- if continue to rise may need to consider D/Cing the baracitinib          Forest Everett M.D

## 2021-11-15 NOTE — PROGRESS NOTES
Hospitalist Progress Note      PCP: Ryan Chris MD    Date of Admission: 2021    Chief Complaint on Admission: SOB    Pt Seen/Examined and Chart Reviewed. Admitting dx covid pneumonia    SUBJECTIVE:     SOB slightly improved, will on 6L NC, very SOB, weak, unsteady, tolerating PO      OBJECTIVE:   Vitals    TEMPERATURE:  Current - Temp: 97.7 °F (36.5 °C); Max - Temp  Av.9 °F (36.6 °C)  Min: 97.7 °F (36.5 °C)  Max: 98 °F (36.7 °C)  RESPIRATIONS RANGE: Resp  Av.4  Min: 23  Max: 28  PULSE RANGE: Pulse  Av.5  Min: 79  Max: 104  BLOOD PRESSURE RANGE:  Systolic (52GDV), OLE:860 , Min:112 , XTS:957   ; Diastolic (24UTR), BPI:11, Min:68, Max:95    PULSE OXIMETRY RANGE: SpO2  Av.4 %  Min: 91 %  Max: 98 %  24HR INTAKE/OUTPUT:  No intake or output data in the 24 hours ending 11/15/21 1627    Exam:    General appearance: Well, no apparent distress, appears stated age and cooperative. obese  HEENT Normal cephalic, atraumatic without obvious deformity. Pupils equal, round, and reactive to light. Extra ocular muscles intact. Conjunctivae/corneas clear. Neck: Supple, No jugular venous distention/bruits. Trachea midline without thyromegaly or adenopathy with full range of motion. Lungs: bilateral rales, no rhonchi, no wheezing, mild resp distress  Heart: Regular rate and rhythm with Normal S1/S2 without  murmurs, rubs or gallops, point of maximum impulse non-displaced  Abdomen: Soft, non-tender or non-distended without rigidity or guarding and positive bowel sounds all four quadrants. Extremities: No clubbing, cyanosis, or edema bilaterally. Full range of motion without deformity   Skin: Skin color, texture, turgor normal. No rashes or lesions. Neurologic: Alert and oriented X 3,  neurovascularly intact with sensory/motor intact upper extremities/lower extremities, bilaterally.  Cranial nerves:II-XII intact, grossly non-focal.  Mental status: Alert, oriented, thought content appropriate. Data    Recent Labs     11/13/21  0555 11/14/21  0636   WBC 8.7 8.9   HGB 15.8 16.6   HCT 46.9 49.5    352      Recent Labs     11/13/21  0555 11/14/21  0636    141   K 3.8 4.4    101   CO2 24 27   BUN 17 18   CREATININE 0.7* 0.7*     Recent Labs     11/13/21  0555 11/14/21  0636   AST 84* 102*   * 197*   BILITOT 0.6 0.6   ALKPHOS 44 48     No results for input(s): INR in the last 72 hours. No results for input(s): CKTOTAL, CKMB, CKMBINDEX, TROPONINI in the last 72 hours. Imaging/Test Results    c diff neg        Consults:     IP CONSULT TO HOSPITALIST  IP CONSULT TO PHARMACY  IP CONSULT TO PHARMACY  IP CONSULT TO GI  IP CONSULT TO PULMONOLOGY    Allergies  Patient has no known allergies. Medications      Scheduled Meds:   dexamethasone  6 mg IntraVENous Q24H    ascorbic acid  500 mg Oral Daily    zinc sulfate  50 mg Oral Daily    ipratropium  2 puff Inhalation Q4H    albuterol sulfate HFA  2 puff Inhalation Q4H    sodium chloride flush  5-40 mL IntraVENous 2 times per day    enoxaparin  30 mg SubCUTAneous BID    famotidine  20 mg Oral BID    vitamin D  2,000 Units Oral Daily    remdesivir IVPB  100 mg IntraVENous Q24H    baricitinib  4 mg Oral Daily       Infusions:   sodium chloride         PRN Meds:  loperamide, sodium chloride flush, sodium chloride, ondansetron **OR** ondansetron, polyethylene glycol, acetaminophen **OR** acetaminophen, guaiFENesin-dextromethorphan, sodium chloride, benzonatate, albuterol sulfate HFA **AND** ipratropium    ASSESSMENT AND PLAN      Active Hospital Problems    Diagnosis Date Noted    Pneumonia due to COVID-19 virus [U07.1, J12.82] 11/11/2021       Problems being addressed this admission:   # Covid -19 pneumonia with Acute Respiratory failure    CXR noted for patchy bilateral infiltrates.  Had outpt +Covid testing.  received Remdesivir-monitor LFT's, IV Decadron, Scheduled MDI's,adjunctive medications, anti-tussive/expectorant medications. COVID labs, Respiratory/contact isolation. Monitor closely  Acute hypoxic respiratory failure due to COVID 19 - Pt not on home O2. Currently requiring 6L NC, he seems to be responding well to the treatment plan requires high flow oxygen continue to monitor.  His influenza A&B was negative.  Patient was not vaccinated for COVID-19 he plans to be now.    on baricitinib 4 mg daily Decadron 6 mg IV daily remdesivir 100 mg daily     Diarrhea in setting of Covid -19   c diff neg, improved     # Transaminitis   GI consulted, d/t covid     # Hyperglycemia   Stress induced,  hemoglobin A1c 6.0 he is not diabetic     Morbid obesity--bmi 36, encouraged weight loss     Consultants:  GI, pulm        PT/OT Eval Status: deferred    DVT Prophylaxis: SQ lovenox  Diet: ADULT DIET;  Regular  Code Status: Full Code      Dispo - completing remdesivir, on jovany, decadron, wean oxygen as tolerated    Justo Woodward MD

## 2021-11-15 NOTE — CONSULTS
Subjective:   CHIEF COMPLAINT / HPI:  27 year young male admitted with covid pneumonia. He is unvaccinated. he was started on rx but still on 6 litres o2. He has mild cough and he has sob with minimal exertion      Past Medical History:  History reviewed. No pertinent past medical history.     Past Surgical History:        Procedure Laterality Date    EXTERNAL EAR SURGERY      INNER EAR SURGERY      TYMPANOSTOMY TUBE PLACEMENT         Current Medications:    Current Facility-Administered Medications: loperamide (IMODIUM) capsule 2 mg, 2 mg, Oral, Q6H PRN  dexamethasone (DECADRON) injection 6 mg, 6 mg, IntraVENous, Q24H  ascorbic acid (VITAMIN C) tablet 500 mg, 500 mg, Oral, Daily  zinc sulfate (ZINCATE) capsule 50 mg, 50 mg, Oral, Daily  ipratropium (ATROVENT HFA) 17 MCG/ACT inhaler 2 puff, 2 puff, Inhalation, Q4H  albuterol sulfate  (90 Base) MCG/ACT inhaler 2 puff, 2 puff, Inhalation, Q4H  sodium chloride flush 0.9 % injection 5-40 mL, 5-40 mL, IntraVENous, 2 times per day  sodium chloride flush 0.9 % injection 5-40 mL, 5-40 mL, IntraVENous, PRN  0.9 % sodium chloride infusion, 25 mL, IntraVENous, PRN  enoxaparin (LOVENOX) injection 30 mg, 30 mg, SubCUTAneous, BID  ondansetron (ZOFRAN-ODT) disintegrating tablet 4 mg, 4 mg, Oral, Q8H PRN **OR** ondansetron (ZOFRAN) injection 4 mg, 4 mg, IntraVENous, Q6H PRN  polyethylene glycol (GLYCOLAX) packet 17 g, 17 g, Oral, Daily PRN  acetaminophen (TYLENOL) tablet 650 mg, 650 mg, Oral, Q6H PRN **OR** acetaminophen (TYLENOL) suppository 650 mg, 650 mg, Rectal, Q6H PRN  famotidine (PEPCID) tablet 20 mg, 20 mg, Oral, BID  guaiFENesin-dextromethorphan (ROBITUSSIN DM) 100-10 MG/5ML syrup 5 mL, 5 mL, Oral, Q4H PRN  vitamin D CAPS 2,000 Units, 2,000 Units, Oral, Daily  [COMPLETED] remdesivir 200 mg in sodium chloride 0.9 % 250 mL IVPB, 200 mg, IntraVENous, Once **FOLLOWED BY** remdesivir 100 mg in sodium chloride 0.9 % 250 mL IVPB, 100 mg, IntraVENous, Q24H  0.9 % sodium chloride bolus, 30 mL, IntraVENous, PRN  benzonatate (TESSALON) capsule 100 mg, 100 mg, Oral, TID PRN  baricitinib (OLUMIANT) tablet 4 mg, 4 mg, Oral, Daily  albuterol sulfate  (90 Base) MCG/ACT inhaler 2 puff, 2 puff, Inhalation, Q2H PRN **AND** ipratropium (ATROVENT HFA) 17 MCG/ACT inhaler 1 puff, 1 puff, Inhalation, Q2H PRN    No Known Allergies    Social History:    Social History     Socioeconomic History    Marital status: Single     Spouse name: None    Number of children: None    Years of education: None    Highest education level: None   Occupational History    None   Tobacco Use    Smoking status: Never Smoker    Smokeless tobacco: Never Used   Substance and Sexual Activity    Alcohol use: Yes    Drug use: No    Sexual activity: None   Other Topics Concern    None   Social History Narrative    None     Social Determinants of Health     Financial Resource Strain:     Difficulty of Paying Living Expenses: Not on file   Food Insecurity:     Worried About Running Out of Food in the Last Year: Not on file    Mercedes of Food in the Last Year: Not on file   Transportation Needs:     Lack of Transportation (Medical): Not on file    Lack of Transportation (Non-Medical):  Not on file   Physical Activity:     Days of Exercise per Week: Not on file    Minutes of Exercise per Session: Not on file   Stress:     Feeling of Stress : Not on file   Social Connections:     Frequency of Communication with Friends and Family: Not on file    Frequency of Social Gatherings with Friends and Family: Not on file    Attends Protestant Services: Not on file    Active Member of Clubs or Organizations: Not on file    Attends Club or Organization Meetings: Not on file    Marital Status: Not on file   Intimate Partner Violence:     Fear of Current or Ex-Partner: Not on file    Emotionally Abused: Not on file    Physically Abused: Not on file    Sexually Abused: Not on file   Housing Stability:     Unable to Pay for Housing in the Last Year: Not on file    Number of Places Lived in the Last Year: Not on file    Unstable Housing in the Last Year: Not on file       Family History:   History reviewed. No pertinent family history. Immunization:    There is no immunization history on file for this patient. REVIEW OF SYSTEMS:    CONSTITUTIONAL:  negative for fevers, chills, diaphoresis, activity change, appetite change, fatigue, night sweats and unexpected weight change. EYES:  negative for blurred vision, eye discharge, visual disturbance and icterus  HEENT:  negative for hearing loss, tinnitus, ear drainage, sinus pressure, nasal congestion, epistaxis and snoring  RESPIRATORY:  See HPI  CARDIOVASCULAR:  negative for chest pain, palpitations, exertional chest pressure/discomfort, edema, syncope  GASTROINTESTINAL:  negative for nausea, vomiting, diarrhea, constipation, blood in stool and abdominal pain  GENITOURINARY:  negative for frequency, dysuria and hematuria  HEMATOLOGIC/LYMPHATIC:  negative for easy bruising, bleeding and lymphadenopathy  ALLERGIC/IMMUNOLOGIC:  negative for recurrent infections, angioedema, anaphylaxis and drug reactions  ENDOCRINE:  negative for weight changes and diabetic symptoms including polyuria, polydipsia and polyphagia    MUSCULOSKELETAL:  negative for  pain, joint swelling, decreased range of motion and muscle weakness  NEUROLOGICAL:  negative for headaches, slurred speech, unilateral weakness  PSYCHIATRIC/BEHAVIORAL: negative for hallucinations, behavioral problems, confusion and agitation.      Objective:   PHYSICAL EXAM:      VITALS:    Vitals:    11/14/21 2000 11/15/21 0857 11/15/21 0906 11/15/21 0907   BP: (!) 130/95 112/68     Pulse: 79 104     Resp: 28 27 23 24   Temp: 98 °F (36.7 °C) 97.7 °F (36.5 °C)     TempSrc:  Oral     SpO2: 94% 91% 93% 91%   Weight:       Height:             CONSTITUTIONAL:  awake, alert, cooperative, no apparent distress, and appears stated age  NECK:  Supple, symmetrical, trachea midline, no adenopathy, thyroid symmetric, not enlarged and no tenderness  CHEST: Chest expansion equal and symmetrical, no intercostal retraction. LUNGS:  no increased work of breathing, has expiratory wheezes both lungs, no crackles. CARDIOVASCULAR: S1 and S2, no edema and no JVD  ABDOMEN:  normal bowel sounds, non-distended and no masses palpated, and no tenderness to palpation. No hepatospleenomegaly  LYMPHADENOPATHY:  no axillary or supraclavicular adenopathy. No cervical adnenopathy  PSYCHIATRIC: Oriented to person place and time. No obvious depression or anxiety. MUSCULOSKELETAL: No obvious misalignment or effusion of the joints. No clubbing, cyanosis of the digits. RIGHT AND LEFT LOWER EXTREMITIES: No edema, no inflammation, no tenderness. SKIN:  normal skin color, texture, turgor and no redness, warmth, or swelling. No palpable nodules    DATA:           EXAMINATION:   ONE XRAY VIEW OF THE CHEST       11/11/2021 5:24 am       COMPARISON:   None.       HISTORY:   ORDERING SYSTEM PROVIDED HISTORY: covid   TECHNOLOGIST PROVIDED HISTORY:   Reason for exam:->covid   Reason for Exam: covid   Acuity: Acute   Type of Exam: Initial   Mechanism of Injury: covid   Relevant Medical/Surgical History: covid       FINDINGS:   The cardiac silhouette and mediastinal contours are normal.  Patchy bilateral   lung infiltrates compatible with COVID.  No pleural effusion or pneumothorax.           Impression   1. Patchy bilateral lung infiltrates, compatible with COVID pneumonia.                         Assessment:     1. Ac resp failure  2. covid pneumonia          Plan:     1. D/w pt  2. Adequate o2 adm but try to wean as tolerated  3. Cont decadron  4. On baricitinab  5. About to finish course of remdesivir  6. Slowly increase activity  7.  Thanks will follow

## 2021-11-16 VITALS
HEIGHT: 72 IN | SYSTOLIC BLOOD PRESSURE: 109 MMHG | DIASTOLIC BLOOD PRESSURE: 77 MMHG | WEIGHT: 267.3 LBS | HEART RATE: 70 BPM | TEMPERATURE: 98.7 F | OXYGEN SATURATION: 91 % | RESPIRATION RATE: 23 BRPM | BODY MASS INDEX: 36.21 KG/M2

## 2021-11-16 PROBLEM — J96.01 ACUTE RESPIRATORY FAILURE WITH HYPOXIA (HCC): Status: ACTIVE | Noted: 2021-11-16

## 2021-11-16 PROBLEM — R74.01 TRANSAMINITIS: Status: ACTIVE | Noted: 2021-11-16

## 2021-11-16 PROBLEM — U07.1 DIARRHEA DUE TO COVID-19: Status: ACTIVE | Noted: 2021-11-16

## 2021-11-16 PROBLEM — A08.39 DIARRHEA DUE TO COVID-19: Status: ACTIVE | Noted: 2021-11-16

## 2021-11-16 LAB
ALBUMIN SERPL-MCNC: 3.5 GM/DL (ref 3.4–5)
ALP BLD-CCNC: 51 IU/L (ref 40–129)
ALT SERPL-CCNC: 223 U/L (ref 10–40)
AST SERPL-CCNC: 68 IU/L (ref 15–37)
BILIRUB SERPL-MCNC: 0.7 MG/DL (ref 0–1)
BILIRUBIN DIRECT: 0.3 MG/DL (ref 0–0.3)
BILIRUBIN, INDIRECT: 0.4 MG/DL (ref 0–0.7)
MAGNESIUM: 2.3 MG/DL (ref 1.8–2.4)
TOTAL PROTEIN: 6.6 GM/DL (ref 6.4–8.2)

## 2021-11-16 PROCEDURE — 2700000000 HC OXYGEN THERAPY PER DAY

## 2021-11-16 PROCEDURE — 6360000002 HC RX W HCPCS: Performed by: FAMILY MEDICINE

## 2021-11-16 PROCEDURE — 94640 AIRWAY INHALATION TREATMENT: CPT

## 2021-11-16 PROCEDURE — 94664 DEMO&/EVAL PT USE INHALER: CPT

## 2021-11-16 PROCEDURE — 2580000003 HC RX 258: Performed by: NURSE PRACTITIONER

## 2021-11-16 PROCEDURE — 36415 COLL VENOUS BLD VENIPUNCTURE: CPT

## 2021-11-16 PROCEDURE — 6360000002 HC RX W HCPCS: Performed by: NURSE PRACTITIONER

## 2021-11-16 PROCEDURE — 83735 ASSAY OF MAGNESIUM: CPT

## 2021-11-16 PROCEDURE — 6370000000 HC RX 637 (ALT 250 FOR IP): Performed by: NURSE PRACTITIONER

## 2021-11-16 PROCEDURE — 94618 PULMONARY STRESS TESTING: CPT

## 2021-11-16 PROCEDURE — 6370000000 HC RX 637 (ALT 250 FOR IP): Performed by: INTERNAL MEDICINE

## 2021-11-16 PROCEDURE — 6370000000 HC RX 637 (ALT 250 FOR IP): Performed by: FAMILY MEDICINE

## 2021-11-16 PROCEDURE — 80076 HEPATIC FUNCTION PANEL: CPT

## 2021-11-16 PROCEDURE — 94761 N-INVAS EAR/PLS OXIMETRY MLT: CPT

## 2021-11-16 RX ORDER — ALBUTEROL SULFATE 90 UG/1
2 AEROSOL, METERED RESPIRATORY (INHALATION) 2 TIMES DAILY
Qty: 18 G | Refills: 3 | Status: SHIPPED | OUTPATIENT
Start: 2021-11-16

## 2021-11-16 RX ORDER — DEXAMETHASONE 6 MG/1
6 TABLET ORAL
Qty: 5 TABLET | Refills: 0 | Status: SHIPPED | OUTPATIENT
Start: 2021-11-16 | End: 2021-11-21

## 2021-11-16 RX ORDER — GUAIFENESIN/DEXTROMETHORPHAN 100-10MG/5
5 SYRUP ORAL EVERY 4 HOURS PRN
Qty: 120 ML | Refills: 0 | Status: SHIPPED | OUTPATIENT
Start: 2021-11-16 | End: 2021-11-26

## 2021-11-16 RX ADMIN — Medication 50 MG: at 09:20

## 2021-11-16 RX ADMIN — BARICITINIB 4 MG: 2 TABLET, FILM COATED ORAL at 09:20

## 2021-11-16 RX ADMIN — SODIUM CHLORIDE, PRESERVATIVE FREE 10 ML: 5 INJECTION INTRAVENOUS at 09:20

## 2021-11-16 RX ADMIN — OXYCODONE HYDROCHLORIDE AND ACETAMINOPHEN 500 MG: 500 TABLET ORAL at 09:20

## 2021-11-16 RX ADMIN — DEXAMETHASONE SODIUM PHOSPHATE 6 MG: 4 INJECTION, SOLUTION INTRAMUSCULAR; INTRAVENOUS at 05:21

## 2021-11-16 RX ADMIN — Medication 2000 UNITS: at 09:20

## 2021-11-16 RX ADMIN — FAMOTIDINE 20 MG: 20 TABLET, FILM COATED ORAL at 09:20

## 2021-11-16 RX ADMIN — ENOXAPARIN SODIUM 30 MG: 100 INJECTION SUBCUTANEOUS at 09:20

## 2021-11-16 RX ADMIN — ALBUTEROL SULFATE 2 PUFF: 90 AEROSOL, METERED RESPIRATORY (INHALATION) at 12:53

## 2021-11-16 NOTE — DISCHARGE SUMMARY
Discharge Summary    Name:  Fredi Frausto /Age/Sex: 1995  (27 y.o. male)   MRN & CSN:  [id number] & [id number] Admission Date/Time: 2021  5:09 AM   Attending: Ezequiel Castillo MD Discharging Physician: Ezequiel Castillo MD     Hospital Course:   Fredi rFausto is a 27 y.o.  male  who presents with Pneumonia due to COVID-19 virus  He has no significant past medical history who presented with shortness of breath, stuffy nose, fevers, cough and diarrhea temperature of 102.1 found to have COVID-19 infection with acute respiratory failure and hypoxia. He received baricitinib and dexamethasone with symptom improvement together with vitamin D and zinc.  He will home oxygen to 3 L at rest and with activity. He was evaluated by pulmonology who will follow him up in outpatient. We will continue on dexamethasone for 5 more days. He was evaluated for home O2 and he qualified with hypoxia at risk and with activity requiring 3 L continuously. He will follow up with PCP and pulmonology clinic in 1 to 2 weeks to reevaluate O2 needs    The patient expressed appropriate understanding of and agreement with the discharge recommendations, medications, and plan.      Consults this admission:  IP CONSULT TO HOSPITALIST  IP CONSULT TO PHARMACY  IP CONSULT TO PHARMACY  IP CONSULT TO GI  IP CONSULT TO PULMONOLOGY    Discharge Instruction:   Follow up appointments:   Primary care physician:  within 2 weeks    Diet:  regular diet   Activity: activity as tolerated  Disposition: Discharged to:   [x]Home, [][facility], []SNF, []Acute Rehab, []Hospice   Condition on discharge: Stable    Discharge Medications:        Medication List      START taking these medications    albuterol sulfate  (90 Base) MCG/ACT inhaler  Inhale 2 puffs into the lungs 2 times daily     dexamethasone 6 MG tablet  Commonly known as: DECADRON  Take 1 tablet by mouth daily (with breakfast) for 5 days     guaiFENesin-dextromethorphan 100-10 MG/5ML syrup  Commonly known as: ROBITUSSIN DM  Take 5 mLs by mouth every 4 hours as needed for Cough           Where to Get Your Medications      These medications were sent to [address], [address] [place] [address], [address]    Phone: [phone]   · albuterol sulfate  (90 Base) MCG/ACT inhaler  · dexamethasone 6 MG tablet  · guaiFENesin-dextromethorphan 100-10 MG/5ML syrup           Objective Findings at Discharge:   /77   Pulse 70   Temp 98.7 °F (37.1 °C) (Oral)   Resp 26   Ht 6' (1.829 m)   Wt 267 lb 4.8 oz (121.2 kg)   SpO2 93%   BMI 36.25 kg/m²            PHYSICAL EXAM   GEN Awake male, sitting upright in bed in no apparent distress. Appears given age. EYES Pupils are equally round. No scleral erythema, discharge, or conjunctivitis. HENT Mucous membranes are moist. Oral pharynx without exudates, no evidence of thrush. NECK Supple, no apparent thyromegaly or masses. RESP Clear to auscultation, no wheezes, rales or rhonchi. Symmetric chest movement while on room air. CARDIO/VASC S1/S2 auscultated. Regular rate without appreciable murmurs, rubs, or gallops. No JVD or carotid bruits. Peripheral pulses equal bilaterally and palpable. No peripheral edema. GI Abdomen is soft without significant tenderness, masses, or guarding. Bowel sounds are normoactive. Rectal exam deferred.  No costovertebral angle tenderness. Normal appearing external genitalia. Soni catheter is not present. HEME/LYMPH No palpable cervical lymphadenopathy and no hepatosplenomegaly. No petechiae or ecchymoses. MSK No gross joint deformities. SKIN Normal coloration, warm, dry. NEURO Cranial nerves appear grossly intact, normal speech, no lateralizing weakness. PSYCH Awake, alert, oriented x 4. Affect appropriate.     BMP/CBC  Recent Labs     11/14/21  0636 11/15/21  1559    139   K 4.4 4.6

## 2021-11-16 NOTE — PROGRESS NOTES
Doctor Please copy and paste below in your progress note per DME requirements. Doctor Please copy and paste below in your progress note per DME requirements. Patient was seen in hospital for Covid 19 . I am prescribing oxygen because the diagnosis and testing requires the patient to have oxygen in the home. Conditions will improve or be benefited by oxygen use. The patient is able to perform good mobility and therefore requires the use of a portable oxygen system for ambulation.

## 2021-11-16 NOTE — PROGRESS NOTES
Outpatient Pharmacy Progress Note for Meds-to-Beds    Total number of Prescriptions Filled: 3  The following medications were delivered to the patient or nursing unit during the discharge process:   Albuterol inhaler   Tussin DM cough & cold   Dexamethasone 6mg        Thank you for letting us serve your patients.   [address]    [address], [address] [facility]    Phone: [phone]    Fax: [phone]

## 2021-11-16 NOTE — PROGRESS NOTES
Took patient off of O2 at rest to see how he tolerates. Within approximately 3 minutes, patient's O2 sat dropped to 87%. Patient placed back on 3 liters, O2 sat currently up to 92%.

## 2021-11-16 NOTE — PROGRESS NOTES
day    enoxaparin  30 mg SubCUTAneous BID    famotidine  20 mg Oral BID    vitamin D  2,000 Units Oral Daily    baricitinib  4 mg Oral Daily     PRN Meds: loperamide, sodium chloride flush, sodium chloride, ondansetron **OR** ondansetron, polyethylene glycol, acetaminophen **OR** acetaminophen, guaiFENesin-dextromethorphan, sodium chloride, benzonatate, albuterol sulfate HFA **AND** ipratropium      DVT Prophylaxis: Subcut enoxaparin  Diet: ADULT DIET; Regular  Code Status: Full Code    Dispo: Patient seen for discharge  We will do home O2 eval    ____________________________________________________________________________    Subjective:   Overnight Events:   Uneventful overnight  Down to 4 L nasal cannula this morning      Physical Exam:  /77   Pulse 70   Temp 98.7 °F (37.1 °C) (Oral)   Resp 26   Ht 6' (1.829 m)   Wt 267 lb 4.8 oz (121.2 kg)   SpO2 93%   BMI 36.25 kg/m²   General appearance: No apparent distress, appears stated age and cooperative. HEENT: Normocephalic, atraumatic, MMM, No sclera icterus/conjuctival palor  Neck: Supple, no thyromegally. No jugular venous distention. Respiratory:  Normal respiratory effort. Clear to auscultation, no Rales/Wheezes/Rhonchi. Cardiovascular: S1/S2 without murmurs, rubs or gallops. RRR  Abdomen: Soft, non-tender, non-distended, bowel sounds present. Musculoskeletal: No clubbing, cyanosis or edema bilaterally. Skin: Skin color, texture, turgor normal.  No rashes or lesions.   Neurologic:  Cranial nerves: II-XII intact, MOSHE, No focal sensory/motor deficits  Psychiatric: Alert and oriented, thought content appropriate  Capillary Refill: Brisk,< 3 seconds   Peripheral Pulses: +2 palpable, equal bilaterally     No intake or output data in the 24 hours ending 11/16/21 1031    Labs:   Recent Labs     11/14/21  0636 11/15/21  1559   WBC 8.9 7.2   HGB 16.6 16.1   HCT 49.5 48.2    426      Recent Labs     11/14/21  0636 11/15/21  1559    139 K 4.4 4.6    103   CO2 27 25   BUN 18 17   CREATININE 0.7* 0.7*   CALCIUM 8.6 8.4   * 61*   * 213*   BILITOT 0.6 0.6   ALKPHOS 48 49     No results for input(s): CKTOTAL, TROPONINI in the last 72 hours. Urinalysis:  No results found for: Chance Lasso, BACTERIA, RBCUA, BLOODU, SPECGRAV, [address]    Radiology:  XR CHEST PORTABLE   Final Result   1. Patchy bilateral lung infiltrates, compatible with COVID pneumonia. Angelica York MD      Please excuse brevity and/or typos. This report was transcribed using voice recognition software. Every effort was made to ensure accuracy, however, inadvertent computerized transcription errors may be present.

## 2021-11-16 NOTE — PROGRESS NOTES
Home O2 eval:  Pt qualified, paperwork faxed to [facility]. [place] was giving to patient with instructions.

## 2021-11-16 NOTE — PROGRESS NOTES
pulmonary      SUBJECTIVE: doing well     OBJECTIVE    VITALS:  /61   Pulse 70   Temp 97.4 °F (36.3 °C) (Oral)   Resp 26   Ht 6' (1.829 m)   Wt 267 lb 4.8 oz (121.2 kg)   SpO2 93%   BMI 36.25 kg/m²   HEAD AND FACE EXAM:  No throat injection, no active exudate,no thrush  NECK EXAM;No JVD, no masses, symmetrical  CHEST EXAM; Expansion equal and symmetrical, no masses  LUNG EXAM; Good breath sounds bilaterally. There are expiratory wheezes both lungs, there are crackles at both lung bases  CARDIOVASCULAR EXAM: Positive S1 and S2, no S3 or S4, no clicks ,no murmurs  RIGHT AND LEFT LOWER EXTRIMITY EXAM: No edema, no swelling, no inflamation  CNS EXAM: Alert and oriented X3          LABS   Lab Results   Component Value Date    WBC 7.2 11/15/2021    HGB 16.1 11/15/2021    HCT 48.2 11/15/2021    MCV 87.0 11/15/2021     11/15/2021     Lab Results   Component Value Date    CREATININE 0.7 (L) 11/15/2021    BUN 17 11/15/2021     11/15/2021    K 4.6 11/15/2021     11/15/2021    CO2 25 11/15/2021     No results found for: INR, PROTIME     No results found for: PHOS   No results for input(s): PH, PO2ART, QDS5CCL, HCO3, BEART, O2SAT in the last 72 hours. Wt Readings from Last 3 Encounters:   11/13/21 267 lb 4.8 oz (121.2 kg)   02/09/19 282 lb (127.9 kg)   10/31/18 272 lb (123.4 kg)               ASSESMENT  covid pneumonia        PLAN  1. He feels better and has difficulty falling asleep and wants to be dc home.   2. I guess he is stable and can be dc home with home o2 with fu with me as outpt    11/16/2021  Anna Glass MD, M.D.

## 2021-11-16 NOTE — PROGRESS NOTES
11/16/2021 10:33 AM  Patient Room #: 5569/8955-S  Patient Name: Reji Myers    (Step 1 Done by RN if possible otherwise call Pulmonary Diagnostics)  1. Place patient on room air at rest for at least 30 minutes. If patient falls below 88% before 30 minutes then you can record the level and stop. Record room air saturation level _87_ %. If patient is at 88% or below, they will qualify for home oxygen and you can stop. If level does not fall below 88%, fill in level above. If indicated continue to Step 2. Signature:Felix Pak, RRT Date:11/16/2021  (Step 2&3 Done by [facility])  2. Ambulate patient on room air until saturation falls below 89%. Record level of room air saturation with ambulation_83%. Next, place patient back on _3_lpm oxygen and ambulate, record level _95_%. (Note:  this level must show improvement from room air level done with ambulation.)  If patients saturation on room air with ambulation is 88% or below AND patient shows improvement with oxygen during ambulation, they will qualify for home oxygen and you can stop. If patient does not drop below 89%, then patient should have an overnight oximetry trending on room air to see if level falls below 88%. Complete level in Step 3 below. 3. Room air overnight oximetry level 88 % for___  cumulative minutes. If patients room air oxygen level is < 89% for at least 5 cumulative minutes, patient will qualify for home oxygen and you can stop. (Attach Night Trending Report)    Complete order below: Diagnosis:___Covid 19_  Home oxygen at:  Length of Need: ? Lifetime ? X 3 Months     __3_lpm or __%   via  [x] nasal cannula  []mask  [] other: Conserving Device         [x]continuous [x]  with activity  [x]  Nocturnal   [x] Portable Tanks [x]  Concentrator        Therapist Signature:Cuca Eaton, RRT  Date:11/16/2021  Physician Signature:  __Electronically Signed in EMR_    Date:___  Physician Printed Name:  __ Emigdio Michaels MD  NPI:  [id number]  [x] Patient Qualifies      [] Patient Does NOT qualify

## 2021-11-17 ENCOUNTER — CARE COORDINATION (OUTPATIENT)
Dept: CASE MANAGEMENT | Age: 26
End: 2021-11-17

## 2021-11-17 NOTE — CARE COORDINATION
Patient contacted regarding COVID-19 Diagnosis. Discussed COVID-19 related testing which was available at this time. Test results were positive 2021 Patient informed of results, if available? Yes. Pt reports that he is using O2 (3L/NC that was provided by [facility] upon DC. Care Transition Nurse contacted the patient by telephone to perform post discharge assessment. Call within 2 business days of discharge: Yes. Verified name and  with patient as identifiers. Provided introduction to self, and explanation of the CTN/ACM role, and reason for call due to risk factors for infection and/or exposure to COVID-19. Symptoms reviewed with patient who verbalized the following symptoms: no worsening symptoms. Due to no new or worsening symptoms encounter was not routed to provider for escalation. Discussed follow-up appointments. If no appointment was previously scheduled, appointment scheduling offered: Yes. Pt prefers to make own [facility] follow up appointment(s): No future appointments. Non-face-to-face services provided:  Education of patient/family/caregiver/guardian to support self-management--  Assessment and support for treatment adherence and medication management--     Advance Care Planning:   Does patient have an Advance Directive:  not on file; education provided. Educated patient about risk for severe COVID-19 due to risk factors according to CDC guidelines. CTN reviewed discharge instructions, medical action plan and red flag symptoms with the patient who verbalized understanding. Discussed COVID vaccination status: Yes. Education provided on COVID-19 vaccination as appropriate. Discussed exposure protocols and quarantine with CDC Guidelines. Patient was given an opportunity to verbalize any questions and concerns and agrees to contact CTN or health care provider for questions related to their healthcare.     Reviewed and educated patient on any new and changed medications related to discharge diagnosis     Was patient discharged with a pulse oximeter? Yes Discussed and confirmed pulse oximeter discharge instructions and when to notify provider or seek emergency care. CTN provided contact information. Plan for follow-up call in 5-7 days based on severity of symptoms and risk factors.     Omkar Hannon RN [phone]

## 2021-11-17 NOTE — CARE COORDINATION
Xiao 45 Transitions Initial Follow Up Call    Call within 2 business days of discharge: Yes    Patient: Jose Luna Patient : 1995   MRN: <[id number]>  Reason for Admission:  Covid PNA  Discharge Date: 21 RARS: Readmission Risk Score: 5 ( )      Last Discharge [facility]       Complaint Diagnosis Description Type Department Provider    21 Shortness of Breath COVID-19 . .. ED to Hosp-Admission (Discharged) (ADMITTED) [facility] Merlynn Paget, MD; Sweta May. .. Spoke with: Lion Richmond. Reports     O2        Denies         [place]  [facility] [place]    Non-face-to-face services provided:  Scheduled appointment with PCP--  Scheduled appointment with Specialist--  Education of patient/family/caregiver/guardian to support self-management--  Assessment and support for treatment adherence and medication management--    Care Transitions 24 Hour Call    Schedule Follow Up Appointment with PCP: Completed  Do you have any ongoing symptoms?: No  Do you have a copy of your discharge instructions?: Yes  Do you have all of your prescriptions and are they filled?: Yes  Have you been contacted by a [facility] Pharmacist?: No  Have you scheduled your follow up appointment?: No  Were you discharged with any Home Care or Post Acute Services: No  Do you feel like you have everything you need to keep you well at home?: Yes  Care Transitions Interventions   Home Care Waiver: Declined        DME Assistance: Declined   Disease Association: Completed             Follow Up  No future appointments. Transitions of Care Initial Call    Was this an external facility discharge? No Discharge Facility:  [facility]    Challenges to be reviewed by the provider   Additional needs identified to be addressed with provider: No  none             Method of communication with provider : none      Advance Care Planning:   Does patient have an Advance Directive: not on file; education provided.      Was this a readmission? No  Patient stated reason for admission: Covid PNA  Patients top risk factors for readmission: medical condition-Covid PNA    Care Transition Nurse (CTN) contacted the patient by telephone to perform post hospital discharge assessment. Verified name and  with patient as identifiers. Provided introduction to self, and explanation of the CTN role. CTN reviewed discharge instructions, medical action plan and red flags with patient who verbalized understanding. Patient given an opportunity to ask questions and does not have any further questions or concerns at this time. Were discharge instructions available to patient? Yes. Reviewed appropriate site of care based on symptoms and resources available to patient including: PCP, Specialist, Urgent care clinics and When to call 911. The patient agrees to contact the PCP office for questions related to their healthcare. Medication reconciliation was performed with patient, who verbalizes understanding of administration of home medications. Advised obtaining a 90-day supply of all daily and as-needed medications. Covid Risk Education     Educated patient about risk for severe COVID-19 due to risk factors according to CDC guidelines. CTN reviewed discharge instructions, medical action plan and red flag symptoms with the patient who verbalized understanding   Education provided on COVID-19 vaccination as appropriate. Discussed exposure protocols and quarantine with CDC Guidelines. Patient was given an opportunity to verbalize any questions and concerns and agrees to contact CTN or health care provider for questions related to their healthcare. Reviewed and educated patient on any new and changed medications related to discharge diagnosis. Was patient discharged with a pulse oximeter? Yes Discussed and confirmed pulse oximeter discharge instructions and when to notify provider or seek emergency care. CTN provided contact information.  Plan for follow-up call in 5-7 days based on severity of symptoms and risk factors.   Plan for next call: symptom management--, follow up appointment-- and medication management--        Vandana Ferro RN

## 2021-11-24 ENCOUNTER — CARE COORDINATION (OUTPATIENT)
Dept: CASE MANAGEMENT | Age: 26
End: 2021-11-24

## 2021-11-29 ENCOUNTER — CARE COORDINATION (OUTPATIENT)
Dept: CASE MANAGEMENT | Age: 26
End: 2021-11-29

## 2021-12-02 ENCOUNTER — CARE COORDINATION (OUTPATIENT)
Dept: CASE MANAGEMENT | Age: 26
End: 2021-12-02

## 2021-12-02 NOTE — CARE COORDINATION
Care Transitions Outreach Attempt      Attempted to reach patient for transitions of care follow up. Unable to reach patient. HIPPA compliant vmail message left with pt to return this CTN's call. CTN to reattempt. Patient: Jose Luna Patient : 1995 MRN: [id number]    Last Discharge [facility]       Complaint Diagnosis Description Type Department Provider    21 Shortness of Breath COVID-19 . .. ED to Hosp-Admission (Discharged) (ADMITTED) [address] Merlynn Paget, MD; Sweta May. .. Was this an external facility discharge? No Discharge Facility: [facility]    Noted following upcoming appointments from discharge chart review:   [facility] follow up appointment(s): No future appointments.   Non-[facility] follow up appointment(s):     Gold Luna RN CTN

## 2021-12-08 ENCOUNTER — CARE COORDINATION (OUTPATIENT)
Dept: CASE MANAGEMENT | Age: 26
End: 2021-12-08

## 2021-12-08 NOTE — CARE COORDINATION
Care Transitions Subsequent  Outreach call Attempt    Attempted to reach patient for transitions of care follow up. Unable to reach patient. HIPPA compliant vm message left with request for return call/ CTN to continue to follow & reattempt. Patient: Magui Arango Patient : 1995 MRN: [id number]    Last Discharge [facility]       Complaint Diagnosis Description Type Department Provider    21 Shortness of Breath COVID-19 . .. ED to Hosp-Admission (Discharged) (ADMITTED) [address] Pat Joe MD; Manuel Jalloh. .. Was this an external facility discharge? No Discharge Facility: [facility]    Noted following upcoming appointments from discharge chart review:   [address] follow up appointment(s): No future appointments.   Non-[facility] follow up appointment(s):     Brianna Lerma RN [phone]

## 2021-12-10 ENCOUNTER — CARE COORDINATION (OUTPATIENT)
Dept: CASE MANAGEMENT | Age: 26
End: 2021-12-10

## 2021-12-15 ENCOUNTER — CARE COORDINATION (OUTPATIENT)
Dept: CASE MANAGEMENT | Age: 26
End: 2021-12-15

## 2025-03-12 ENCOUNTER — APPOINTMENT (OUTPATIENT)
Dept: CT IMAGING | Age: 30
End: 2025-03-12
Payer: OTHER MISCELLANEOUS

## 2025-03-12 ENCOUNTER — HOSPITAL ENCOUNTER (EMERGENCY)
Age: 30
Discharge: HOME | End: 2025-03-12
Payer: OTHER MISCELLANEOUS

## 2025-03-12 VITALS
DIASTOLIC BLOOD PRESSURE: 58 MMHG | OXYGEN SATURATION: 100 % | HEART RATE: 65 BPM | RESPIRATION RATE: 18 BRPM | BODY MASS INDEX: 36.4 KG/M2 | SYSTOLIC BLOOD PRESSURE: 100 MMHG | HEIGHT: 71 IN | WEIGHT: 260 LBS | TEMPERATURE: 98.5 F

## 2025-03-12 DIAGNOSIS — S01.81XA CHIN LACERATION, INITIAL ENCOUNTER: ICD-10-CM

## 2025-03-12 DIAGNOSIS — S08.0XXA AVULSION OF SCALP, INITIAL ENCOUNTER: ICD-10-CM

## 2025-03-12 DIAGNOSIS — S09.90XA INJURY OF HEAD, INITIAL ENCOUNTER: ICD-10-CM

## 2025-03-12 DIAGNOSIS — T14.90XA TRAUMA: ICD-10-CM
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
## 2025-03-12 LAB
ANION GAP SERPL CALCULATED.3IONS-SCNC: 11 MMOL/L (ref 9–17)
BASOPHILS # BLD: 0.05 K/UL
BASOPHILS NFR BLD: 1 % (ref 0–1)
BUN SERPL-MCNC: 19 MG/DL (ref 7–20)
CALCIUM SERPL-MCNC: 10.1 MG/DL (ref 8.3–10.6)
CHLORIDE SERPL-SCNC: 107 MMOL/L (ref 99–110)
CO2 SERPL-SCNC: 25 MMOL/L (ref 21–32)
CREAT SERPL-MCNC: 0.9 MG/DL (ref 0.9–1.3)
EOSINOPHIL # BLD: 0.07 K/UL
EOSINOPHILS RELATIVE PERCENT: 1 % (ref 0–3)
ERYTHROCYTE [DISTWIDTH] IN BLOOD BY AUTOMATED COUNT: 12.7 % (ref 11.7–14.9)
GFR, ESTIMATED: >90 ML/MIN/1.73M2
GLUCOSE SERPL-MCNC: 119 MG/DL (ref 74–99)
HCT VFR BLD AUTO: 49.8 % (ref 42–52)
HGB BLD-MCNC: 16.4 G/DL (ref 13.5–18)
IMM GRANULOCYTES # BLD AUTO: 0.02 K/UL
IMM GRANULOCYTES NFR BLD: 0 %
LYMPHOCYTES NFR BLD: 1.08 K/UL
LYMPHOCYTES RELATIVE PERCENT: 12 % (ref 24–44)
MCH RBC QN AUTO: 28.9 PG (ref 27–31)
MCHC RBC AUTO-ENTMCNC: 32.9 G/DL (ref 32–36)
MCV RBC AUTO: 87.7 FL (ref 78–100)
MONOCYTES NFR BLD: 0.76 K/UL
MONOCYTES NFR BLD: 9 % (ref 0–4)
NEUTROPHILS NFR BLD: 77 % (ref 36–66)
NEUTS SEG NFR BLD: 6.71 K/UL
PLATELET # BLD AUTO: 270 K/UL (ref 140–440)
PMV BLD AUTO: 11 FL (ref 7.5–11.1)
POTASSIUM SERPL-SCNC: 3.7 MMOL/L (ref 3.5–5.1)
RBC # BLD AUTO: 5.68 M/UL (ref 4.6–6.2)
SODIUM SERPL-SCNC: 142 MMOL/L (ref 136–145)
WBC OTHER # BLD: 8.7 K/UL (ref 4–10.5)

## 2025-03-12 PROCEDURE — 72125 CT NECK SPINE W/O DYE: CPT

## 2025-03-12 PROCEDURE — 74177 CT ABD & PELVIS W/CONTRAST: CPT

## 2025-03-12 PROCEDURE — 70450 CT HEAD/BRAIN W/O DYE: CPT

## 2025-03-12 PROCEDURE — 80048 BASIC METABOLIC PNL TOTAL CA: CPT

## 2025-03-12 PROCEDURE — 72131 CT LUMBAR SPINE W/O DYE: CPT

## 2025-03-12 PROCEDURE — 72128 CT CHEST SPINE W/O DYE: CPT

## 2025-03-12 PROCEDURE — 99285 EMERGENCY DEPT VISIT HI MDM: CPT

## 2025-03-12 PROCEDURE — 6360000004 HC RX CONTRAST MEDICATION: Performed by: NURSE PRACTITIONER

## 2025-03-12 PROCEDURE — 85025 COMPLETE CBC W/AUTO DIFF WBC: CPT

## 2025-03-12 PROCEDURE — 71260 CT THORAX DX C+: CPT

## 2025-03-12 RX ORDER — IOPAMIDOL 755 MG/ML
75 INJECTION, SOLUTION INTRAVASCULAR
Status: COMPLETED | OUTPATIENT
Start: 2025-03-12 | End: 2025-03-12

## 2025-03-12 RX ORDER — IBUPROFEN 600 MG/1
600 TABLET, FILM COATED ORAL 3 TIMES DAILY PRN
Qty: 30 TABLET | Refills: 0 | Status: SHIPPED | OUTPATIENT
Start: 2025-03-12

## 2025-03-12 RX ORDER — METHOCARBAMOL 750 MG/1
750 TABLET, FILM COATED ORAL 4 TIMES DAILY
Qty: 20 TABLET | Refills: 0 | Status: SHIPPED | OUTPATIENT
Start: 2025-03-12 | End: 2025-03-17

## 2025-03-12 RX ORDER — LIDOCAINE HYDROCHLORIDE 20 MG/ML
10 INJECTION, SOLUTION INFILTRATION; PERINEURAL ONCE
Status: DISCONTINUED | OUTPATIENT
Start: 2025-03-12 | End: 2025-03-12 | Stop reason: HOSPADM

## 2025-03-12 RX ADMIN — IOPAMIDOL 75 ML: 755 INJECTION, SOLUTION INTRAVENOUS at 08:53

## 2025-03-12 ASSESSMENT — PAIN DESCRIPTION - ORIENTATION: ORIENTATION: MID

## 2025-03-12 ASSESSMENT — LIFESTYLE VARIABLES
HOW OFTEN DO YOU HAVE A DRINK CONTAINING ALCOHOL: NEVER
HOW MANY STANDARD DRINKS CONTAINING ALCOHOL DO YOU HAVE ON A TYPICAL DAY: PATIENT DOES NOT DRINK

## 2025-03-12 ASSESSMENT — ENCOUNTER SYMPTOMS
ABDOMINAL PAIN: 0
BACK PAIN: 0
SHORTNESS OF BREATH: 0

## 2025-03-12 ASSESSMENT — PAIN SCALES - GENERAL: PAINLEVEL_OUTOF10: 3

## 2025-03-12 ASSESSMENT — PAIN - FUNCTIONAL ASSESSMENT: PAIN_FUNCTIONAL_ASSESSMENT: 0-10

## 2025-03-12 ASSESSMENT — PAIN DESCRIPTION - LOCATION: LOCATION: HEAD
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
## 2025-03-12 NOTE — ED PROVIDER NOTES
ascending aorta, aortic arch, and descending aorta demonstrate normal contour. There is no evidence for aneurysm or dissection. The  coronary arteries are grossly normal caliber without focal abnormality. Heart: Cardiac size is normal.  There is no evidence for pericardial effusion. Mediastinum: No pathologically enlarged mediastinal lymph nodes are seen. No focal mass lesions are identified. There is no evidence for hiatal hernia. Pleura: There is no evidence for pleural effusion or focal pleural thickening.  No pneumothorax. Tracheobronchial tree: The tracheobronchial tree is clear. No endobronchial or endotracheal lesions are seen. Lungs : The lungs fields are clear. There is no evidence for a discrete suspicious nodule or mass. There is no focal consolidation. Bones: No significant osseous abnormalities are noted. Chest wall: The chest wall and axilla are within normal limits. Upper abdomen: The visualized portions of the liver, pancreas, and spleen appear  normal. No adrenal mass. IMPRESSION: 1.  Nonacute CT chest This dictation was created with voice recognition software.  While attempts have  been made to review the dictation as it is transcribed, on occasion the spoken word can be misinterpreted by the technology leading to omissions or inappropriate words, phrases or sentences. .  Dictated and Electronically Signed By: Wayne Briones DO 3/12/2025 9:28        CT CERVICAL SPINE WO CONTRAST  Result Date: 3/12/2025  CT CERVICAL SPINE WO CONTRAST, CT THORACIC SPINE WO CONTRAST, CT LUMBAR SPINE WO  CONTRAST ORDER DATE: 3/12/2025 8:47 Clinical indication: Trauma Comparison: None Technique: Routine noncontrast spiral axial CT images were obtained through the cervical, thoracic, and lumbar spines with coronal and sagittal reconstructed images processed.  CT radiation dose optimization techniques (automated exposure  control, use of iterative reconstruction techniques, or adjustment of the mA and/or kV according to

## 2025-06-21 ENCOUNTER — HOSPITAL ENCOUNTER (EMERGENCY)
Age: 30
Discharge: HOME | End: 2025-06-21
Attending: STUDENT IN AN ORGANIZED HEALTH CARE EDUCATION/TRAINING PROGRAM
Payer: COMMERCIAL

## 2025-06-21 VITALS
SYSTOLIC BLOOD PRESSURE: 133 MMHG | RESPIRATION RATE: 16 BRPM | OXYGEN SATURATION: 98 % | HEIGHT: 71 IN | BODY MASS INDEX: 34.72 KG/M2 | TEMPERATURE: 97.3 F | HEART RATE: 72 BPM | DIASTOLIC BLOOD PRESSURE: 62 MMHG | WEIGHT: 248 LBS

## 2025-06-21 DIAGNOSIS — L23.7 POISON IVY DERMATITIS: Primary | ICD-10-CM

## 2025-06-21 PROCEDURE — 99283 EMERGENCY DEPT VISIT LOW MDM: CPT

## 2025-06-21 RX ORDER — METHYLPREDNISOLONE 4 MG/1
TABLET ORAL
Qty: 1 KIT | Refills: 0 | Status: SHIPPED | OUTPATIENT
Start: 2025-06-21 | End: 2025-06-27

## 2025-06-21 ASSESSMENT — PAIN - FUNCTIONAL ASSESSMENT: PAIN_FUNCTIONAL_ASSESSMENT: NONE - DENIES PAIN

## 2025-06-21 NOTE — ED PROVIDER NOTES
Emergency Department Encounter    Patient: Alphonso Santana  MRN: [id number]  : 1995  Date of Evaluation: 2025  ED Provider:  Merlin Hendrix DO    Triage Chief Complaint:   Rash (To bilateral legs. Noticed today. States \" it's poison ivy.\")    [place]:  Alphonso Santana is a 29 y.o. male that presents with rash of bilateral legs for 1 day.  States that yesterday he was working on some fencing in his yard.  Previously when he has done this he has been exposed to poison ivy and ignored it for several days and it got a lot worse.  He did require steroids at that time.  Reports this morning he noticed itchy scattered areas of redness to his legs.  This is similar to how he presented earlier with poison ivy previously.    MDM:    History from : Patient    Patient well-appearing normal vital signs.  On examination of his legs he appears to have dermatitis consistent with poison ivy.  He benefited from steroids previously so we will start him on a steroid course now at this time as well.  No signs of cellulitis.  I do not think he needs antibiotics.  Recommended recheck with PCP.           Patient was given the following medications:  Medications - No data to display    Discharge condition: stable    I am the Primary Clinician of Record.    Is this patient to be included in the SEP-1 Core Measure due to severe sepsis or septic shock?   No   Exclusion criteria - the patient is NOT to be included for SEP-1 Core Measure due to:  2+ SIRS criteria are not met        ROS - see HPI, below listed is current ROS at time of my eval:  systems reviewed and negative except as above.     History reviewed. No pertinent past medical history.  Past Surgical History:   Procedure Laterality Date    EXTERNAL EAR SURGERY      INNER EAR SURGERY      TYMPANOSTOMY TUBE PLACEMENT       History reviewed. No pertinent family history.  Social History     Socioeconomic History    Marital status: Single     Spouse name: Not on file    Number of